# Patient Record
Sex: MALE | Race: WHITE | NOT HISPANIC OR LATINO | Employment: FULL TIME | URBAN - METROPOLITAN AREA
[De-identification: names, ages, dates, MRNs, and addresses within clinical notes are randomized per-mention and may not be internally consistent; named-entity substitution may affect disease eponyms.]

---

## 2017-04-07 ENCOUNTER — COMMUNICATION - HEALTHEAST (OUTPATIENT)
Dept: INTERNAL MEDICINE | Facility: CLINIC | Age: 47
End: 2017-04-07

## 2017-07-14 ENCOUNTER — OFFICE VISIT - HEALTHEAST (OUTPATIENT)
Dept: INTERNAL MEDICINE | Facility: CLINIC | Age: 47
End: 2017-07-14

## 2017-07-14 DIAGNOSIS — L30.9 ECZEMA: ICD-10-CM

## 2017-07-14 DIAGNOSIS — K64.9 HEMORRHOID: ICD-10-CM

## 2017-07-19 ENCOUNTER — RECORDS - HEALTHEAST (OUTPATIENT)
Dept: ADMINISTRATIVE | Facility: OTHER | Age: 47
End: 2017-07-19

## 2017-07-20 ENCOUNTER — SURGERY - HEALTHEAST (OUTPATIENT)
Dept: SURGERY | Facility: HOSPITAL | Age: 47
End: 2017-07-20

## 2017-07-20 ENCOUNTER — ANESTHESIA - HEALTHEAST (OUTPATIENT)
Dept: SURGERY | Facility: HOSPITAL | Age: 47
End: 2017-07-20

## 2017-07-20 ASSESSMENT — MIFFLIN-ST. JEOR: SCORE: 1753.5

## 2017-07-31 ENCOUNTER — RECORDS - HEALTHEAST (OUTPATIENT)
Dept: ADMINISTRATIVE | Facility: OTHER | Age: 47
End: 2017-07-31

## 2017-08-01 ENCOUNTER — RECORDS - HEALTHEAST (OUTPATIENT)
Dept: ADMINISTRATIVE | Facility: OTHER | Age: 47
End: 2017-08-01

## 2017-08-15 ENCOUNTER — RECORDS - HEALTHEAST (OUTPATIENT)
Dept: ADMINISTRATIVE | Facility: OTHER | Age: 47
End: 2017-08-15

## 2017-08-17 ENCOUNTER — RECORDS - HEALTHEAST (OUTPATIENT)
Dept: ADMINISTRATIVE | Facility: OTHER | Age: 47
End: 2017-08-17

## 2017-08-18 ENCOUNTER — RECORDS - HEALTHEAST (OUTPATIENT)
Dept: ADMINISTRATIVE | Facility: OTHER | Age: 47
End: 2017-08-18

## 2017-08-22 ENCOUNTER — RECORDS - HEALTHEAST (OUTPATIENT)
Dept: ADMINISTRATIVE | Facility: OTHER | Age: 47
End: 2017-08-22

## 2017-08-23 ENCOUNTER — RECORDS - HEALTHEAST (OUTPATIENT)
Dept: ADMINISTRATIVE | Facility: OTHER | Age: 47
End: 2017-08-23

## 2017-08-30 ENCOUNTER — RECORDS - HEALTHEAST (OUTPATIENT)
Dept: ADMINISTRATIVE | Facility: OTHER | Age: 47
End: 2017-08-30

## 2017-09-06 ENCOUNTER — RECORDS - HEALTHEAST (OUTPATIENT)
Dept: ADMINISTRATIVE | Facility: OTHER | Age: 47
End: 2017-09-06

## 2017-09-18 ENCOUNTER — RECORDS - HEALTHEAST (OUTPATIENT)
Dept: ADMINISTRATIVE | Facility: OTHER | Age: 47
End: 2017-09-18

## 2017-09-28 ENCOUNTER — RECORDS - HEALTHEAST (OUTPATIENT)
Dept: ADMINISTRATIVE | Facility: OTHER | Age: 47
End: 2017-09-28

## 2017-09-29 ENCOUNTER — RECORDS - HEALTHEAST (OUTPATIENT)
Dept: ADMINISTRATIVE | Facility: OTHER | Age: 47
End: 2017-09-29

## 2017-11-28 ENCOUNTER — RECORDS - HEALTHEAST (OUTPATIENT)
Dept: ADMINISTRATIVE | Facility: OTHER | Age: 47
End: 2017-11-28

## 2017-12-07 ENCOUNTER — COMMUNICATION - HEALTHEAST (OUTPATIENT)
Dept: INTERNAL MEDICINE | Facility: CLINIC | Age: 47
End: 2017-12-07

## 2018-02-08 ENCOUNTER — RECORDS - HEALTHEAST (OUTPATIENT)
Dept: ADMINISTRATIVE | Facility: OTHER | Age: 48
End: 2018-02-08

## 2018-02-09 ENCOUNTER — RECORDS - HEALTHEAST (OUTPATIENT)
Dept: ADMINISTRATIVE | Facility: OTHER | Age: 48
End: 2018-02-09

## 2018-03-08 ENCOUNTER — RECORDS - HEALTHEAST (OUTPATIENT)
Dept: ADMINISTRATIVE | Facility: OTHER | Age: 48
End: 2018-03-08

## 2018-06-14 ENCOUNTER — COMMUNICATION - HEALTHEAST (OUTPATIENT)
Dept: INTERNAL MEDICINE | Facility: CLINIC | Age: 48
End: 2018-06-14

## 2018-06-14 DIAGNOSIS — L30.9 ECZEMA: ICD-10-CM

## 2019-02-27 ENCOUNTER — COMMUNICATION - HEALTHEAST (OUTPATIENT)
Dept: INTERNAL MEDICINE | Facility: CLINIC | Age: 49
End: 2019-02-27

## 2019-02-27 DIAGNOSIS — L30.9 ECZEMA: ICD-10-CM

## 2019-06-14 ENCOUNTER — COMMUNICATION - HEALTHEAST (OUTPATIENT)
Dept: SCHEDULING | Facility: CLINIC | Age: 49
End: 2019-06-14

## 2019-06-14 ENCOUNTER — OFFICE VISIT - HEALTHEAST (OUTPATIENT)
Dept: INTERNAL MEDICINE | Facility: CLINIC | Age: 49
End: 2019-06-14

## 2019-06-14 DIAGNOSIS — B00.89 HERPETIC WHITLOW: ICD-10-CM

## 2019-06-14 ASSESSMENT — MIFFLIN-ST. JEOR: SCORE: 1797.44

## 2019-07-02 ENCOUNTER — OFFICE VISIT - HEALTHEAST (OUTPATIENT)
Dept: INTERNAL MEDICINE | Facility: CLINIC | Age: 49
End: 2019-07-02

## 2019-07-02 DIAGNOSIS — Z71.84 TRAVEL ADVICE ENCOUNTER: ICD-10-CM

## 2019-07-02 DIAGNOSIS — T70.29XS: ICD-10-CM

## 2019-07-02 RX ORDER — ACETAZOLAMIDE 250 MG/1
250 TABLET ORAL 2 TIMES DAILY PRN
Qty: 14 TABLET | Refills: 0 | Status: SHIPPED | OUTPATIENT
Start: 2019-07-02 | End: 2021-08-31

## 2019-07-02 ASSESSMENT — MIFFLIN-ST. JEOR: SCORE: 1801.12

## 2020-02-20 ENCOUNTER — OFFICE VISIT - HEALTHEAST (OUTPATIENT)
Dept: INTERNAL MEDICINE | Facility: CLINIC | Age: 50
End: 2020-02-20

## 2020-02-20 ENCOUNTER — RECORDS - HEALTHEAST (OUTPATIENT)
Dept: GENERAL RADIOLOGY | Facility: CLINIC | Age: 50
End: 2020-02-20

## 2020-02-20 DIAGNOSIS — M54.2 NECK PAIN: ICD-10-CM

## 2020-02-20 DIAGNOSIS — M54.2 CERVICALGIA: ICD-10-CM

## 2020-02-20 DIAGNOSIS — L30.9 ECZEMA: ICD-10-CM

## 2020-02-20 ASSESSMENT — MIFFLIN-ST. JEOR: SCORE: 1771.64

## 2020-03-19 ENCOUNTER — AMBULATORY - HEALTHEAST (OUTPATIENT)
Dept: INTERNAL MEDICINE | Facility: CLINIC | Age: 50
End: 2020-03-19

## 2020-03-19 ENCOUNTER — COMMUNICATION - HEALTHEAST (OUTPATIENT)
Dept: INTERNAL MEDICINE | Facility: CLINIC | Age: 50
End: 2020-03-19

## 2020-03-19 ASSESSMENT — ANXIETY QUESTIONNAIRES
1. FEELING NERVOUS, ANXIOUS, OR ON EDGE: NEARLY EVERY DAY
6. BECOMING EASILY ANNOYED OR IRRITABLE: SEVERAL DAYS
GAD7 TOTAL SCORE: 13
7. FEELING AFRAID AS IF SOMETHING AWFUL MIGHT HAPPEN: NOT AT ALL
IF YOU CHECKED OFF ANY PROBLEMS ON THIS QUESTIONNAIRE, HOW DIFFICULT HAVE THESE PROBLEMS MADE IT FOR YOU TO DO YOUR WORK, TAKE CARE OF THINGS AT HOME, OR GET ALONG WITH OTHER PEOPLE: SOMEWHAT DIFFICULT
5. BEING SO RESTLESS THAT IT IS HARD TO SIT STILL: NOT AT ALL
3. WORRYING TOO MUCH ABOUT DIFFERENT THINGS: NEARLY EVERY DAY
4. TROUBLE RELAXING: NEARLY EVERY DAY
2. NOT BEING ABLE TO STOP OR CONTROL WORRYING: NEARLY EVERY DAY

## 2020-03-19 ASSESSMENT — PATIENT HEALTH QUESTIONNAIRE - PHQ9: SUM OF ALL RESPONSES TO PHQ QUESTIONS 1-9: 8

## 2020-03-26 ENCOUNTER — OFFICE VISIT - HEALTHEAST (OUTPATIENT)
Dept: INTERNAL MEDICINE | Facility: CLINIC | Age: 50
End: 2020-03-26

## 2020-03-26 DIAGNOSIS — F41.1 GENERALIZED ANXIETY DISORDER: ICD-10-CM

## 2020-03-26 DIAGNOSIS — Z72.0 TOBACCO ABUSE: ICD-10-CM

## 2020-03-26 DIAGNOSIS — F32.0 MILD MAJOR DEPRESSION (H): ICD-10-CM

## 2020-03-26 DIAGNOSIS — R03.0 ELEVATED BLOOD PRESSURE READING WITHOUT DIAGNOSIS OF HYPERTENSION: ICD-10-CM

## 2020-03-26 ASSESSMENT — ANXIETY QUESTIONNAIRES
4. TROUBLE RELAXING: MORE THAN HALF THE DAYS
6. BECOMING EASILY ANNOYED OR IRRITABLE: MORE THAN HALF THE DAYS
IF YOU CHECKED OFF ANY PROBLEMS ON THIS QUESTIONNAIRE, HOW DIFFICULT HAVE THESE PROBLEMS MADE IT FOR YOU TO DO YOUR WORK, TAKE CARE OF THINGS AT HOME, OR GET ALONG WITH OTHER PEOPLE: SOMEWHAT DIFFICULT
3. WORRYING TOO MUCH ABOUT DIFFERENT THINGS: MORE THAN HALF THE DAYS
7. FEELING AFRAID AS IF SOMETHING AWFUL MIGHT HAPPEN: NOT AT ALL
2. NOT BEING ABLE TO STOP OR CONTROL WORRYING: MORE THAN HALF THE DAYS
GAD7 TOTAL SCORE: 11
5. BEING SO RESTLESS THAT IT IS HARD TO SIT STILL: SEVERAL DAYS
1. FEELING NERVOUS, ANXIOUS, OR ON EDGE: MORE THAN HALF THE DAYS

## 2020-03-26 ASSESSMENT — PATIENT HEALTH QUESTIONNAIRE - PHQ9: SUM OF ALL RESPONSES TO PHQ QUESTIONS 1-9: 7

## 2020-03-26 ASSESSMENT — MIFFLIN-ST. JEOR: SCORE: 1762.57

## 2020-03-27 ENCOUNTER — COMMUNICATION - HEALTHEAST (OUTPATIENT)
Dept: INTERNAL MEDICINE | Facility: CLINIC | Age: 50
End: 2020-03-27

## 2020-03-30 ASSESSMENT — ANXIETY QUESTIONNAIRES
2. NOT BEING ABLE TO STOP OR CONTROL WORRYING: MORE THAN HALF THE DAYS
6. BECOMING EASILY ANNOYED OR IRRITABLE: MORE THAN HALF THE DAYS
IF YOU CHECKED OFF ANY PROBLEMS ON THIS QUESTIONNAIRE, HOW DIFFICULT HAVE THESE PROBLEMS MADE IT FOR YOU TO DO YOUR WORK, TAKE CARE OF THINGS AT HOME, OR GET ALONG WITH OTHER PEOPLE: SOMEWHAT DIFFICULT
GAD7 TOTAL SCORE: 11
7. FEELING AFRAID AS IF SOMETHING AWFUL MIGHT HAPPEN: NOT AT ALL
3. WORRYING TOO MUCH ABOUT DIFFERENT THINGS: MORE THAN HALF THE DAYS
1. FEELING NERVOUS, ANXIOUS, OR ON EDGE: MORE THAN HALF THE DAYS
4. TROUBLE RELAXING: MORE THAN HALF THE DAYS
5. BEING SO RESTLESS THAT IT IS HARD TO SIT STILL: SEVERAL DAYS

## 2020-03-30 ASSESSMENT — PATIENT HEALTH QUESTIONNAIRE - PHQ9: SUM OF ALL RESPONSES TO PHQ QUESTIONS 1-9: 7

## 2020-05-11 ENCOUNTER — COMMUNICATION - HEALTHEAST (OUTPATIENT)
Dept: INTERNAL MEDICINE | Facility: CLINIC | Age: 50
End: 2020-05-11

## 2020-05-12 ENCOUNTER — OFFICE VISIT - HEALTHEAST (OUTPATIENT)
Dept: INTERNAL MEDICINE | Facility: CLINIC | Age: 50
End: 2020-05-12

## 2020-05-12 DIAGNOSIS — F32.1 MODERATE MAJOR DEPRESSION (H): ICD-10-CM

## 2020-05-12 DIAGNOSIS — Z72.0 TOBACCO ABUSE: ICD-10-CM

## 2020-05-12 DIAGNOSIS — F41.1 GENERALIZED ANXIETY DISORDER: ICD-10-CM

## 2020-05-12 DIAGNOSIS — T43.225A ADVERSE EFFECT OF SELECTIVE SEROTONIN REUPTAKE INHIBITOR (SSRI), INITIAL ENCOUNTER: ICD-10-CM

## 2020-05-12 ASSESSMENT — ANXIETY QUESTIONNAIRES
GAD7 TOTAL SCORE: 5
1. FEELING NERVOUS, ANXIOUS, OR ON EDGE: SEVERAL DAYS
IF YOU CHECKED OFF ANY PROBLEMS ON THIS QUESTIONNAIRE, HOW DIFFICULT HAVE THESE PROBLEMS MADE IT FOR YOU TO DO YOUR WORK, TAKE CARE OF THINGS AT HOME, OR GET ALONG WITH OTHER PEOPLE: NOT DIFFICULT AT ALL
5. BEING SO RESTLESS THAT IT IS HARD TO SIT STILL: MORE THAN HALF THE DAYS
2. NOT BEING ABLE TO STOP OR CONTROL WORRYING: NOT AT ALL
6. BECOMING EASILY ANNOYED OR IRRITABLE: SEVERAL DAYS
7. FEELING AFRAID AS IF SOMETHING AWFUL MIGHT HAPPEN: NOT AT ALL
3. WORRYING TOO MUCH ABOUT DIFFERENT THINGS: NOT AT ALL
4. TROUBLE RELAXING: SEVERAL DAYS

## 2020-05-12 ASSESSMENT — PATIENT HEALTH QUESTIONNAIRE - PHQ9: SUM OF ALL RESPONSES TO PHQ QUESTIONS 1-9: 14

## 2020-05-14 ENCOUNTER — COMMUNICATION - HEALTHEAST (OUTPATIENT)
Dept: INTERNAL MEDICINE | Facility: CLINIC | Age: 50
End: 2020-05-14

## 2020-06-02 ENCOUNTER — OFFICE VISIT - HEALTHEAST (OUTPATIENT)
Dept: INTERNAL MEDICINE | Facility: CLINIC | Age: 50
End: 2020-06-02

## 2020-06-02 DIAGNOSIS — H57.9 EYE PRESSURE: ICD-10-CM

## 2020-06-02 DIAGNOSIS — F19.982 DRUG-INDUCED INSOMNIA (H): ICD-10-CM

## 2020-06-02 DIAGNOSIS — F32.1 MODERATE MAJOR DEPRESSION (H): ICD-10-CM

## 2020-06-02 ASSESSMENT — ANXIETY QUESTIONNAIRES
7. FEELING AFRAID AS IF SOMETHING AWFUL MIGHT HAPPEN: NOT AT ALL
6. BECOMING EASILY ANNOYED OR IRRITABLE: SEVERAL DAYS
GAD7 TOTAL SCORE: 8
2. NOT BEING ABLE TO STOP OR CONTROL WORRYING: MORE THAN HALF THE DAYS
1. FEELING NERVOUS, ANXIOUS, OR ON EDGE: MORE THAN HALF THE DAYS
3. WORRYING TOO MUCH ABOUT DIFFERENT THINGS: MORE THAN HALF THE DAYS
IF YOU CHECKED OFF ANY PROBLEMS ON THIS QUESTIONNAIRE, HOW DIFFICULT HAVE THESE PROBLEMS MADE IT FOR YOU TO DO YOUR WORK, TAKE CARE OF THINGS AT HOME, OR GET ALONG WITH OTHER PEOPLE: NOT DIFFICULT AT ALL
4. TROUBLE RELAXING: SEVERAL DAYS
5. BEING SO RESTLESS THAT IT IS HARD TO SIT STILL: NOT AT ALL

## 2020-06-02 ASSESSMENT — PATIENT HEALTH QUESTIONNAIRE - PHQ9: SUM OF ALL RESPONSES TO PHQ QUESTIONS 1-9: 9

## 2020-06-04 ENCOUNTER — COMMUNICATION - HEALTHEAST (OUTPATIENT)
Dept: INTERNAL MEDICINE | Facility: CLINIC | Age: 50
End: 2020-06-04

## 2020-06-04 DIAGNOSIS — F32.1 MODERATE MAJOR DEPRESSION (H): ICD-10-CM

## 2020-12-04 ENCOUNTER — COMMUNICATION - HEALTHEAST (OUTPATIENT)
Dept: INTERNAL MEDICINE | Facility: CLINIC | Age: 50
End: 2020-12-04

## 2020-12-04 DIAGNOSIS — F32.1 MODERATE MAJOR DEPRESSION (H): ICD-10-CM

## 2020-12-26 ENCOUNTER — RECORDS - HEALTHEAST (OUTPATIENT)
Dept: ADMINISTRATIVE | Facility: OTHER | Age: 50
End: 2020-12-26

## 2020-12-26 LAB
ALT SERPL W/O P-5'-P-CCNC: 30 UNITS/L (ref 6–55)
AST SERPL-CCNC: 25 UNITS/L (ref 5–34)
CREAT SERPL-MCNC: 1.36 MG/DL (ref 0.65–1.25)
GFR ESTIMATE EXT - HISTORICAL: 55 ML/MIN/1.73M2
GFR ESTIMATE, IF BLACK EXT - HISTORICAL: >60 ML/MIN/1.73M2

## 2020-12-31 ENCOUNTER — COMMUNICATION - HEALTHEAST (OUTPATIENT)
Dept: INTERNAL MEDICINE | Facility: CLINIC | Age: 50
End: 2020-12-31

## 2021-01-11 ENCOUNTER — OFFICE VISIT - HEALTHEAST (OUTPATIENT)
Dept: INTERNAL MEDICINE | Facility: CLINIC | Age: 51
End: 2021-01-11

## 2021-01-11 DIAGNOSIS — N20.0 CALCULUS OF KIDNEY: ICD-10-CM

## 2021-01-11 DIAGNOSIS — L30.9 ECZEMA: ICD-10-CM

## 2021-01-11 ASSESSMENT — PATIENT HEALTH QUESTIONNAIRE - PHQ9: SUM OF ALL RESPONSES TO PHQ QUESTIONS 1-9: 4

## 2021-01-12 ENCOUNTER — COMMUNICATION - HEALTHEAST (OUTPATIENT)
Dept: INTERNAL MEDICINE | Facility: CLINIC | Age: 51
End: 2021-01-12

## 2021-01-12 ENCOUNTER — AMBULATORY - HEALTHEAST (OUTPATIENT)
Dept: UROLOGY | Facility: CLINIC | Age: 51
End: 2021-01-12

## 2021-01-12 ENCOUNTER — OFFICE VISIT - HEALTHEAST (OUTPATIENT)
Dept: UROLOGY | Facility: CLINIC | Age: 51
End: 2021-01-12

## 2021-01-12 DIAGNOSIS — N20.0 CALCULUS OF KIDNEY: ICD-10-CM

## 2021-01-12 DIAGNOSIS — N20.0 KIDNEY STONE: ICD-10-CM

## 2021-01-13 ENCOUNTER — RECORDS - HEALTHEAST (OUTPATIENT)
Dept: HEALTH INFORMATION MANAGEMENT | Facility: CLINIC | Age: 51
End: 2021-01-13

## 2021-05-26 ASSESSMENT — PATIENT HEALTH QUESTIONNAIRE - PHQ9
SUM OF ALL RESPONSES TO PHQ QUESTIONS 1-9: 8
SUM OF ALL RESPONSES TO PHQ QUESTIONS 1-9: 14

## 2021-05-27 ASSESSMENT — PATIENT HEALTH QUESTIONNAIRE - PHQ9
SUM OF ALL RESPONSES TO PHQ QUESTIONS 1-9: 9
SUM OF ALL RESPONSES TO PHQ QUESTIONS 1-9: 7
SUM OF ALL RESPONSES TO PHQ QUESTIONS 1-9: 4

## 2021-05-28 ASSESSMENT — ANXIETY QUESTIONNAIRES
GAD7 TOTAL SCORE: 13
GAD7 TOTAL SCORE: 8
GAD7 TOTAL SCORE: 11
GAD7 TOTAL SCORE: 5

## 2021-05-29 NOTE — TELEPHONE ENCOUNTER
"Patient calling as he went on [employer] to request appointment regarding wart on his finger.   Finger feels full and unable to bend finger without pain.     Reason for Disposition    SEVERE pain (e.g., excruciating, unable use hand at all)    Answer Assessment - Initial Assessment Questions  1. ONSET: \"When did the pain start?\"       Yesterday  2. LOCATION and RADIATION: \"Where is the pain located?\"  (e.g., fingertip, around nail, joint, entire   finger)       Middle finger on right hand.  Bump is located on side of finger.   3. SEVERITY: \"How bad is the pain?\" \"What does it keep you from doing?\"   (Scale 1-10; or mild, moderate, severe)   - MILD - doesn't interfere with normal activities    - MODERATE - interferes with normal activities or awakens from sleep   - SEVERE - excruciating pain, unable to hold a glass of water or bend finger even a little      Pt rates pain an 8/10 when using it.  Constant 4-5 when not using it.    4. APPEARANCE: \"What does the finger look like?\" (e.g., redness, swelling, bruising, pallor)      Bump on side of finger is grey in color outlined with white.  Outer edge is red. Increasing redness throughout the day.    5. WORK OR EXERCISE: \"Has there been any recent work or exercise that involved this part of the body?\"      Denies  6. CAUSE: \"What do you think is causing the pain?\"      Thought it was a wart at first now is thinking an infection.  7. AGGRAVATING FACTORS: \"What makes the pain worse?\" (e.g., using computer)      movement  8. OTHER SYMPTOMS: \"Do you have any other symptoms?\" (e.g., fever, neck pain, numbness)      Denies  9. PREGNANCY: \"Is there any chance you are pregnant?\" \"When was your last menstrual period?\"      no    Protocols used: FINGER PAIN-A-OH    No opening to bump on finger.   Sensitive to the touch.    No red streaks.   No recent bug or animal bites.     Per protocol patient to be seen in clinic today for evaluation. Pt agreed to plan.   Pt prefers to go to " walk-in-clinic today in [place].   Pt will be seen today.   Amanda Mcwilliams, RN   [facility] RN Triage

## 2021-05-29 NOTE — PATIENT INSTRUCTIONS - HE
If any significant increase in drainage, swelling, redness, pain, fever, weakness, numbness or other concerning symptom seek medical care immediately.

## 2021-05-29 NOTE — PROGRESS NOTES
[facility] Clinic Note    Patient Name: Thanh Huang  Patient Age: 48 y.o.  YOB: 1970  MRN: [id number]    Date of visit: 6/14/2019    Assessment/Plan:  No results found for this or any previous visit (from the past 24 hour(s)).  Medications Ordered   Medications     acyclovir (ZOVIRAX) 5 % ointment     Sig: Apply thin layer to affected area     Dispense:  15 g     Refill:  0       ICD-10-CM    1. Herpetic nathanael B00.89 acyclovir (ZOVIRAX) 5 % ointment       Differential diagnosis includes herpetic nathanael versus bacterial infection.  I would much favor herpetic nathanael given the fact that there is no surrounding erythema or purulence at this point.  We discussed risks and benefits for incision and drainage, we agreed on waiting for now, he will go to the urgent care over the weekend if it is worsening significantly.  We discussed that hand infections can be very serious and so if it is worsening he needs to get in quickly.  We will try topical acyclovir.  He will keep it covered in the meantime, we discussed pathogenesis and how it spreads.    Patient Instructions   If any significant increase in drainage, swelling, redness, pain, fever, weakness, numbness or other concerning symptom seek medical care immediately.        Counseled patient regarding treatments, treatment options, risks and benefits and diagnosis.  The patient was interactive, attentive, verbalized understanding, and we discussed plan.       Patient Active Problem List   Diagnosis     Contact Dermatitis     Nephrolithiasis     Anorectal abscess     Perirectal abscess     Social History     Social History Narrative    [employer]  on leave since 2014. Grew up in [place]-adopted. Closest family is brother in [facility].  Contact here Fr. Julio Hargrove. Rare tobacco/non drinker. ; lives in [place] [facility]     Family History   Adopted: Yes     Outpatient Encounter Medications as of 6/14/2019   Medication Sig Dispense Refill      fluocinonide-emollient (LIDEX-E) 0.05 % Crea Apply 1 application topically 2 (two) times a day as needed. Apply to area(s) of eczema as directed. 30 g 2     hydrocortisone (ANUSOL-HC) 2.5 % rectal cream Insert 1 application into the rectum every 4 (four) hours as needed for hemorrhoids.       hydrocortisone 25 mg suppository Insert 25 mg into the rectum 2 (two) times a day as needed for hemorrhoids.       ibuprofen (ADVIL,MOTRIN) 200 MG tablet Take 400 mg by mouth every 6 (six) hours as needed for pain.        lidocaine (XYLOCAINE) 5 % ointment Applied to perirectal area as needed for discomfort 35.44 g 0     methylcellulose (CITRUCEL) Powd Take 2 g by mouth daily.       senna-docusate (PERICOLACE) 8.6-50 mg tablet Take 1 tablet by mouth daily. 20 tablet 0     acyclovir (ZOVIRAX) 5 % ointment Apply thin layer to affected area 15 g 0     No facility-administered encounter medications on file as of 6/14/2019.        Chief Complaint:   Chief Complaint   Patient presents with     Finger Injury     right hand, 3rd digit       /86 (Patient Site: Left Arm, Patient Position: Sitting, Cuff Size: Adult Regular)   Pulse 74   Ht 6' (1.829 m)   Wt 197 lb 3 oz (89.4 kg)   SpO2 98%   BMI 26.74 kg/m    HPI:   Over the last 24 hours has noticed a vesicle develop on the ulnar aspect of his third digit on the right hand middle phalanx.  He has increased in pain in size.  No distal numbness or weakness.  He has not had a cold sore recently that he knows of.  He does have a history of them however.  Able to move his finger okay but feels a little swollen.  No history of skin infections.    ROS: Pertinent ros findings in hpi, all other systems negative.    Objective/Physical Exam:   Gen: NAD, appears age  Skin: warm, dry  HENT: normocephalic atraumatic, MMM  Eyes: non-icteric, no proptosis  CV: NRRR no m/r/g, no peripheral edema  Resp: CTAB no w/r/r, normal respiratory effort  Abd: non-distended, soft  Hematologic: No  petechiae or purpura  MSK: no muscle or joint swelling  Neuro: no dysarthria or gross asymmetry  Psych: Cooperative, full affect    Right third digit middle phalanx ulnar aspect there is a 1 cm diameter vesicle with clear appearing fluid and 1 much smaller vesicle just proximal of this.  There is an erythematous base there is no surrounding erythema or significant swelling.  No significant tenderness surrounding this area but it is tender at the vesicle itself.  Good cap refill and range of motion of the finger.  Radial pulses 2+.  /86 (Patient Site: Left Arm, Patient Position: Sitting, Cuff Size: Adult Regular)   Pulse 74   Ht 6' (1.829 m)   Wt 197 lb 3 oz (89.4 kg)   SpO2 98%   BMI 26.74 kg/m             Harjinder Troy MD

## 2021-05-30 ENCOUNTER — COMMUNICATION - HEALTHEAST (OUTPATIENT)
Dept: INTERNAL MEDICINE | Facility: CLINIC | Age: 51
End: 2021-05-30

## 2021-05-30 DIAGNOSIS — F32.1 MODERATE MAJOR DEPRESSION (H): ICD-10-CM

## 2021-05-30 NOTE — PROGRESS NOTES
Internal Medicine Office Visit  [facility]  Patient Name: Thanh Huang  Patient Age: 48 y.o.  YOB: 1970  MRN: [id number]    Date of Visit: 2019  Reason for Office Visit:   Chief Complaint   Patient presents with     Travel Consult     [place], , Elevation sickness.            Assessment / Plan / Medical Decision Makin. Og syndrome, sequela  - acetaZOLAMIDE (DIAMOX) 250 MG tablet; Take 1 tablet (250 mg total) by mouth 2 (two) times a day as needed.  Dispense: 14 tablet; Refill: 0    2. Travel advice encounter  Patient is provided CDC educational material discussed with patient malaria and typhoid in addition to yellow fever he reports no concerns and elects for no additional immunizations today.      No orders of the defined types were placed in this encounter.  Followup: Return in about 6 months (around 2020). earlier if needed.    Health Maintenance Review  Health Maintenance   Topic Date Due     INFLUENZA VACCINE RULE BASED (1) 2019     ADVANCE DIRECTIVES DISCUSSED WITH PATIENT  2021     TD 18+ HE  2022     TDAP ADULT ONE TIME DOSE  Completed         I have discontinued Leif Huang's hydrocortisone, hydrocortisone, senna-docusate, lidocaine, fluocinonide-emollient, and acyclovir. I am also having him start on acetaZOLAMIDE. Additionally, I am having him maintain his ibuprofen and methylcellulose.      HPI:  Thanh Huang is a 48 y.o. year old who presents to the office today Travel visit to [place].  Patient will be traveling to [place] on 2019 with a history of altitude sickness.  Patient reports that he will be in [place], [facility] and [facility].  He states he will not be in remote areas he states he has no concern for food or water borne illnesses.  He has done extensive international travel and does present with his immunization records.  Patient has however report he has had altitude illness in the past  he is requesting refill of Diamox which aided in his symptoms from his previous altitude illness during a previous travel visit.  Patient reports no addition concerns today          Review of Systems- pertinent positive in bold:  Constitutional: Fever, chills, night sweats, fainting, weight change, fatigue, dizziness, sleeping difficulties, loud snoring/pauses in breathing  Eyes: change in vision, blurred or double vision, redness/eye pain  Ears, nose, mouth, throat: change in hearing, ear pain, hoarseness, difficulty swallowing, sores in the mouth or throat  Respiratory: shortness of breath, cough, bloody sputum, wheezing  Cardiovascular: chest pain, palpitations   Gastrointestinal: abdominal pain, heartburn/indigestion, nausea/vomiting, change in appetite, change in bowel habits, constipation or diarrhea, rectal bleeding/dark stools, difficulty swallowing  Urinary: painful urination, frequent urination, urinary urgency/incontinence, blood in urine/dark urine, nocturia (new or increasing), muscle cramps, swelling of hands, feet, ankles, leg pain/redness  Skin: change in moles/freckles, rash, nodules  Hematologic/lymphatic: swollen lymph glands, abnormal bruising/bleeding  Endocrine: excessive thirst/urination, cold or heat intolerance  Neurologic/emotional: worrisome memory change, numbness/tingling, anxiety, mood swings      Current Scheduled Meds:  Outpatient Encounter Medications as of 7/2/2019   Medication Sig Dispense Refill     ibuprofen (ADVIL,MOTRIN) 200 MG tablet Take 400 mg by mouth every 6 (six) hours as needed for pain.        methylcellulose (CITRUCEL) Powd Take 2 g by mouth daily.       acetaZOLAMIDE (DIAMOX) 250 MG tablet Take 1 tablet (250 mg total) by mouth 2 (two) times a day as needed. 14 tablet 0     [DISCONTINUED] acyclovir (ZOVIRAX) 5 % ointment Apply thin layer to affected area 15 g 0     [DISCONTINUED] fluocinonide-emollient (LIDEX-E) 0.05 % Crea Apply 1 application topically 2 (two) times a  day as needed. Apply to area(s) of eczema as directed. 30 g 2     [DISCONTINUED] hydrocortisone (ANUSOL-HC) 2.5 % rectal cream Insert 1 application into the rectum every 4 (four) hours as needed for hemorrhoids.       [DISCONTINUED] hydrocortisone 25 mg suppository Insert 25 mg into the rectum 2 (two) times a day as needed for hemorrhoids.       [DISCONTINUED] lidocaine (XYLOCAINE) 5 % ointment Applied to perirectal area as needed for discomfort 35.44 g 0     [DISCONTINUED] senna-docusate (PERICOLACE) 8.6-50 mg tablet Take 1 tablet by mouth daily. 20 tablet 0     No facility-administered encounter medications on file as of 7/2/2019.      Past Medical History:   Diagnosis Date     Anxiety 2015    panic attacks/infrequent     Back pain skin tags     Blood pressure elevated 2015    borderline diastolics 88-92...     Dermatitis      Left nephrolithiasis      Past Surgical History:   Procedure Laterality Date     INCISION AND DRAINAGE PERIRECTAL ABSCESS N/A 7/20/2017    Procedure: INCISION AND DRAINAGE RECTAL ABSCESS;  Surgeon: Pavel Vasques MD;  Location: [facility] [place], [place];  Service:      Social History     Tobacco Use     Smoking status: Light Tobacco Smoker     Types: Cigars     Smokeless tobacco: Never Used     Tobacco comment: daily cigar smoker.. None since yesterday   Substance Use Topics     Alcohol use: Yes     Alcohol/week: 8.4 oz     Types: 7 Glasses of wine, 7 Shots of liquor per week     Drug use: No     Family History   Adopted: Yes     Objective / Physical Examination:  Vitals:    07/02/19 1043   BP: 130/86   Pulse: 74   Resp: 24   Temp: 98.2  F (36.8  C)   SpO2: 95%   Weight: 198 lb (89.8 kg)   Height: 6' (1.829 m)     Wt Readings from Last 3 Encounters:   07/02/19 198 lb (89.8 kg)   06/14/19 197 lb 3 oz (89.4 kg)   07/25/17 192 lb (87.1 kg)     Body mass index is 26.85 kg/m .     General Appearance: Alert and oriented, cooperative, affect appropriate, speech clear, in no apparent distress  Head:  Normocephalic, atraumatic  Eyes:   Conjunctivae clear and sclerae non-icteric  Throat: Lips and mucosa moist.  Lungs Normal inspiratory and expiratory effort  Neuro: Alert and oriented, follows commands appropriately.         Ginna Henley, CNP

## 2021-05-31 ENCOUNTER — RECORDS - HEALTHEAST (OUTPATIENT)
Dept: ADMINISTRATIVE | Facility: CLINIC | Age: 51
End: 2021-05-31

## 2021-05-31 VITALS — WEIGHT: 191 LBS | BODY MASS INDEX: 26.64 KG/M2

## 2021-05-31 VITALS — HEIGHT: 71 IN | BODY MASS INDEX: 26.74 KG/M2 | WEIGHT: 191 LBS

## 2021-05-31 RX ORDER — BUPROPION HYDROCHLORIDE 300 MG/1
TABLET ORAL
Qty: 90 TABLET | Refills: 2 | Status: SHIPPED | OUTPATIENT
Start: 2021-05-31 | End: 2022-04-04

## 2021-06-03 VITALS — HEIGHT: 72 IN | WEIGHT: 197.19 LBS | BODY MASS INDEX: 26.71 KG/M2

## 2021-06-03 VITALS — WEIGHT: 198 LBS | BODY MASS INDEX: 26.82 KG/M2 | HEIGHT: 72 IN

## 2021-06-04 VITALS
DIASTOLIC BLOOD PRESSURE: 90 MMHG | WEIGHT: 195 LBS | BODY MASS INDEX: 27.3 KG/M2 | HEIGHT: 71 IN | SYSTOLIC BLOOD PRESSURE: 120 MMHG

## 2021-06-04 VITALS
SYSTOLIC BLOOD PRESSURE: 126 MMHG | HEIGHT: 71 IN | DIASTOLIC BLOOD PRESSURE: 90 MMHG | BODY MASS INDEX: 27.02 KG/M2 | HEART RATE: 76 BPM | OXYGEN SATURATION: 96 % | WEIGHT: 193 LBS

## 2021-06-06 NOTE — PATIENT INSTRUCTIONS - HE
1. Discussed occipital neuralgia.     2. Use ibuprofen and acetaminophen as needed.     3. Follow up if fails to improve.     4. Follow up age 51 for physical

## 2021-06-06 NOTE — PROGRESS NOTES
ASSESSMENT AND PLAN:    1. Eczema  Finger PIP joints, possible psoriasis.  Refilled, avoid use of this on face or groin.   - fluocinonide-emollient (LIDEX-E) 0.05 % Crea; Apply 1 application topically 2 (two) times a day as needed. Apply to area(s) of eczema as directed.  Dispense: 30 g; Refill: 2    2. Neck pain  Right reji-aural area.  Xray is negative.  HEENT exam is negative. Suspect that this is occipital neuritis.  No clinical indication of mass or lymphadenopathy, or radiculopathy. We discussed this etiology.  Can continue to use NSAID for pain and follow.  MRI head and cervical spine if fails to improve or other symptoms develop.   - XR Cervical Spine 2 - 3 VWS; Future    Patient Instructions   1. Discussed occipital neuralgia.     2. Use ibuprofen and acetaminophen as needed.     3. Follow up if fails to improve.     4. Follow up age 51 for physical     CHIEF COMPLAINT:  Chief Complaint   Patient presents with     Consult     X two weeks right Head and neck discomfort, mild pain radiating      HISTORY OF PRESENT ILLNESS:  Thanh Huang is a 49 y.o. male with 2 weeks of right upper neck and reji-aural area.  A vague sense of radiation at times into the right eye and occipital area.  Not lancinating but variable in intensity.  No radiation into the shoulder or arm.  Initially, was down a bit into the interscapular area on the right.  Neck position does not clearly matter.  Pushing on an area over the mastoid causes an increase in the past.  No visual disturbance, or arm or hand numbness or weakness.  No trauma.     REVIEW OF SYSTEMS:   See HPI, all other systems on review are negative.    Past Medical History:   Diagnosis Date     Anxiety 2015    panic attacks/infrequent     Back pain skin tags     Blood pressure elevated 2015    borderline diastolics 88-92...     Dermatitis      Left nephrolithiasis      Social History     Tobacco Use   Smoking Status Light Tobacco Smoker     Types: Cigars  "  Smokeless Tobacco Never Used   Tobacco Comment    daily cigar smoker.. None since yesterday     Family History   Adopted: Yes     Past Surgical History:   Procedure Laterality Date     INCISION AND DRAINAGE PERIRECTAL ABSCESS N/A 7/20/2017    Procedure: INCISION AND DRAINAGE RECTAL ABSCESS;  Surgeon: Pavel Vasques MD;  [place] [facility] [place]  Service:      VITALS:  Vitals:    02/20/20 1353 02/20/20 1402   BP: (!) 128/100 120/90   Patient Site: Left Arm Left Arm   Patient Position: Sitting Sitting   Cuff Size: Adult Large Adult Large   Weight: 195 lb (88.5 kg)    Height: 5' 11\" (1.803 m)      Wt Readings from Last 3 Encounters:   02/20/20 195 lb (88.5 kg)   07/02/19 198 lb (89.8 kg)   06/14/19 197 lb 3 oz (89.4 kg)     PHYSICAL EXAM:  Constitutional:  In NAD, alert and oriented  HEENT: throat is normal, TM's are normal, nares are clear  Neck: no cervical or axillary adenopathy, no abnormal swelling  Neurologic:  Speech clear, no arm or leg weakness, gait normal       DECISION TO OBTAIN OLD RECORDS AND/OR OBTAIN HISTORY FROM SOMEONE OTHER THAN PATIENT, AND/OR ACCESSING CARE EVERYWHERE):  1  0     REVIEW AND SUMMARIZATION OF OLD RECORDS, AND/OR OBTAINING HISTORY FROM SOMEONE OTHER THAN PATIENT, AND/OR DISCUSSION OF CASE WITH ANOTHER HEALTH CARE PROVIDER:  2 0    REVIEW AND/OR ORDER OF OF CLINICAL LAB TESTS: 1  0.    REVIEW AND/OR ORDER OF RADIOLOGY TESTS: 1 0.    REVIEW AND/OR ORDER OF MEDICAL TESTS (EKG/ECHO/COLONOSCOPY/EGD): 1  0    INDEPENDENT  VISUALIZATION OF IMAGE, TRACING, OR SPECIMEN ITSELF (2 EACH):  2 personally viewed and interpreted the C spine xrays and reviewed with the patient.      TOTAL: 2    Current Outpatient Medications   Medication Sig Dispense Refill     ibuprofen (ADVIL,MOTRIN) 200 MG tablet Take 400 mg by mouth every 6 (six) hours as needed for pain.        methylcellulose (CITRUCEL) Powd Take 2 g by mouth daily.       acetaZOLAMIDE (DIAMOX) 250 MG tablet Take 1 tablet (250 mg " total) by mouth 2 (two) times a day as needed. 14 tablet 0     fluocinonide-emollient (LIDEX-E) 0.05 % Crea Apply 1 application topically 2 (two) times a day as needed. Apply to area(s) of eczema as directed. 30 g 2     Julio Torres MD  Internal Medicine  [facility]

## 2021-06-07 NOTE — TELEPHONE ENCOUNTER
Patient Returning Call  Reason for call:  Patient returning call  Information relayed to patient:  The travel screening questions were asked.   No patient is not ill and does not have sx listed in the questions.   He has not traveled and has not been in contact with anyone with Covid-19.  Patient reports I have been doing isolation with exception of food shopping.   Patient has additional questions:  Yes  If YES, what are your questions/concerns:  Please return call if anything else is needed but will plan to come in as scheduled  Okay to leave a detailed message?: Yes

## 2021-06-07 NOTE — TELEPHONE ENCOUNTER
Left message to call patient. Please relay information below per . Patient can be seen today or as scheduled next week.

## 2021-06-07 NOTE — TELEPHONE ENCOUNTER
"Left message for patient to return call to clinic. Please relay information below per Dr. Romeo.     \"This is something that he will need to come in to see me and discuss.  I cannot prescribe these types of medications for him without a visit-- not knowing him.  If he feels the need to be seen immediately and he is well and has no known sick contacts recently, he can even come in today.  He would need to be screened of course.\"  "

## 2021-06-07 NOTE — TELEPHONE ENCOUNTER
This is something that he will need to come in to see me and discuss.  I cannot prescribe these types of medications for him without a visit-- not knowing him.  If he feels the need to be seen immediately and he is well and has no known sick contacts recently, he can even come in today.  He would need to be screened of course.

## 2021-06-07 NOTE — TELEPHONE ENCOUNTER
I called and left detailed message on personal voicemail. Advised patient to return call to clinic to confirm if he is coming into the clinic tomorrow for an appt.     Please do Travel screen and make sure patient's isn't ill or has been around anyone impacted by COVID19, thank you.

## 2021-06-07 NOTE — PROGRESS NOTES
Office Visit - Follow Up   Thanh Huang   49 y.o. male    Date of Visit: 3/26/2020    Chief Complaint   Patient presents with     Establish Care     referred by friend (Dr. Orion Garcia MD)     Anxiety     increased anixety         Assessment and Plan   1. Generalized anxiety disorder  Counseled patient at length.  He has never struggled with anxiety into the last few years.  I think a trial of SSRI would be helpful here.  He is willing to try something.  He had been prescribed sertraline in the past but did not take it.  He was also prescribed Lexapro done at [facility] and did not take that.  We will try sertraline 25 mg daily for 6 days and increase to 50 mg daily.  He will touch base with me via phone in 3 weeks.  He will contact me via Proteopuret if he has any problems with the medications.  I did  him on side effects that can occur.  - sertraline (ZOLOFT) 50 MG tablet; Take 0.5 tablets (25 mg total) by mouth daily for 6 days, THEN 1 tablet (50 mg total) daily.  Dispense: 90 tablet; Refill: 1    2. Mild major depression (H)  As above.    3. Elevated blood pressure reading without diagnosis of hypertension  We will continue to follow his blood pressure as an outpatient.    4. Tobacco abuse  Have urged complete smoking cessation.        Return in about 3 weeks (around 4/16/2020) for Telephone visit.     History of Present Illness   This 49 y.o. old Thanh comes in today to establish care.  Very pleasant gentleman who has a history of anal abscess but is otherwise been fairly healthy.  He has been dealing with anxiety and depression since around 2011 around the time when he left [facility].  He has been seeing a counselor but has not taken medications.  He has been prescribed medications in the past but did not take them.  He thinks he would need some now.  He is no longer having some head and neck pain that he would originally made the appointment for.  He has a PHQ 9 and a OPAL 7 which  "are reviewed.  He otherwise denies somatic concerns.  He feels well.  He exercises regularly.    He does smoke a rare cigar.  He will have usually a cocktail in the evening and a glass of wine with dinner.  He is adopted and has no known family history.  Born and raised in [place].  Went to [employer].  Ended up here for more schooling and work.  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  Works for a company out of [place] [employer].  He has a mother living still at age 91 who is in good health.  He has a brother who is not adopted in [place] and they are not close he tells me.  He does not have a significant other.  He has dated some women he tells me.    Review of Systems: A comprehensive review of systems was negative except as noted.     Medications, Allergies and Problem List   Reviewed, reconciled and updated  Post Discharge Medication Reconciliation Status:      Physical Exam   General Appearance:   Pleasant middle-aged gentleman in no distress.  HEENT is unremarkable.  Dentition is good.  Lungs are clear.  Heart is regular.  Abdomen soft and nontender.  Blood pressure initially was quite high.  Recheck 126/90.    BP (!) 160/100 (Patient Site: Right Arm, Patient Position: Sitting, Cuff Size: Adult Regular)   Pulse 76   Ht 5' 11\" (1.803 m)   Wt 193 lb (87.5 kg)   SpO2 96%   BMI 26.92 kg/m           Additional Information   Current Outpatient Medications   Medication Sig Dispense Refill     fluocinonide-emollient (LIDEX-E) 0.05 % Crea Apply 1 application topically 2 (two) times a day as needed. Apply to area(s) of eczema as directed. 30 g 2     ibuprofen (ADVIL,MOTRIN) 200 MG tablet Take 400 mg by mouth every 6 (six) hours as needed for pain.        acetaZOLAMIDE (DIAMOX) 250 MG tablet Take 1 tablet (250 mg total) by mouth 2 (two) times a day as needed. 14 tablet 0     " sertraline (ZOLOFT) 50 MG tablet Take 0.5 tablets (25 mg total) by mouth daily for 6 days, THEN 1 tablet (50 mg total) daily. 90 tablet 1     No current facility-administered medications for this visit.      Allergies   Allergen Reactions     Garlic      Melon      Onion      Social History     Tobacco Use     Smoking status: Light Tobacco Smoker     Types: Cigars     Smokeless tobacco: Never Used     Tobacco comment: daily cigar smoker.. None since yesterday   Substance Use Topics     Alcohol use: Yes     Alcohol/week: 14.0 standard drinks     Types: 7 Glasses of wine, 7 Shots of liquor per week     Drug use: No       Review and/or order of clinical lab tests:  Review and/or order of radiology tests:  Review and/or order of medicine tests:  Discussion of test results with performing physician:  Decision to obtain old records and/or obtain history from someone other than the patient:  Review and summarization of old records and/or obtaining history from someone other than the patient and.or discussion of case with another health care provider:  Independent visualization of image, tracing or specimen itself:    Time: total time spent with the patient was 40 minutes of which >50% was spent in counseling and coordination of care     Janusz Romeo MD

## 2021-06-08 NOTE — TELEPHONE ENCOUNTER
RN cannot approve Refill Request    RN can NOT refill this medication historical medication requested. Last office visit: 3/26/2020 Janusz Romeo MD Last Physical: Visit date not found Last MTM visit: Visit date not found Last visit same specialty: 3/26/2020 Janusz Romeo MD.  Next visit within 3 mo: Visit date not found  Next physical within 3 mo: Visit date not found      Omayra CERRATO Nury, [facility] Triage/Med Refill 6/9/2020    Requested Prescriptions   Pending Prescriptions Disp Refills     buPROPion (WELLBUTRIN XL) 300 MG 24 hr tablet [Pharmacy Med Name: BUPROPION XL 300MG TABLETS] 23 tablet 0     Sig: TAKE 1 TABLET BY MOUTH EVERY MORNING AFTER FINISH THE XL 150MG       Tricyclics/Misc Antidepressant/Antianxiety Meds Refill Protocol Passed - 6/4/2020  3:39 PM        Passed - PCP or prescribing provider visit in last year     Last office visit with prescriber/PCP: 3/26/2020 Janusz Romeo MD OR same dept: Visit date not found OR same specialty: 3/26/2020 Janusz Romeo MD  Last physical: Visit date not found Last MTM visit: Visit date not found   Next visit within 3 mo: Visit date not found  Next physical within 3 mo: Visit date not found  Prescriber OR PCP: Janusz Romeo MD  Last diagnosis associated with med order: 1. Moderate major depression (H)  - buPROPion (WELLBUTRIN XL) 300 MG 24 hr tablet [Pharmacy Med Name: BUPROPION XL 300MG TABLETS]; TAKE 1 TABLET BY MOUTH EVERY MORNING AFTER FINISH THE XL 150MG  Dispense: 23 tablet; Refill: 0    If protocol passes may refill for 12 months if within 3 months of last provider visit (or a total of 15 months).

## 2021-06-08 NOTE — TELEPHONE ENCOUNTER
Prior Authorization Request  Who s requesting:  Pharmacy  Pharmacy Name and Location: [facility] #[id number] [place] [address] [address] KUMAR AT [facility]   Medication Name: buPROPion (WELLBUTRIN XL) 150 MG 24 hr tablet     Insurance Plan: [place] [employer]  Insurance Member ID Number:  [id number]  CoverMyMeds Key: [id number]  Informed patient that prior authorizations can take up to 10 business days for response:   NA  Okay to leave a detailed message: Yes

## 2021-06-08 NOTE — PROGRESS NOTES
"Thanh Huang is a 49 y.o. male who is being evaluated via a billable video visit.      The patient has been notified of following:     \"This video visit will be conducted via a call between you and your physician/provider. We have found that certain health care needs can be provided without the need for an in-person physical exam.  This service lets us provide the care you need with a video conversation.  If a prescription is necessary we can send it directly to your pharmacy.  If lab work is needed we can place an order for that and you can then stop by our lab to have the test done at a later time.    Video visits are billed at different rates depending on your insurance coverage. Please reach out to your insurance provider with any questions.    If during the course of the call the physician/provider feels a video visit is not appropriate, you will not be charged for this service.\"    Patient has given verbal consent to a Video visit? Yes    Patient would like to receive their AVS by AVS Preference: Dale.    Patient would like the video invitation sent by: Text to cell phone: [phone]    Will anyone else be joining your video visit? No        Video Start Time: 0946    Additional provider notes:     Office Visit - Follow Up   Thanh Huang   49 y.o. male    Date of Visit: 6/2/2020    Chief Complaint   Patient presents with     Depression     Dox Video CALL #[phone] - discuss possible side effects due to Wellbutrin (bilateral eye pressure and insomnia) - see PHQ9/GAD7         Assessment and Plan   1. Moderate major depression (H)  Better control with current regimen.  He would like to continue same.  If his eye pressure issue returns or persists he is to meet with ophthalmology.  Similarly if he has any vision disturbance.  I will recheck with him in 1 month.    2. Eye pressure  As above.    3. Drug-induced insomnia (H)  Resolved for now.        Return in about 4 weeks (around " 6/30/2020) for Recheck.     History of Present Illness   This 49 y.o. old patient follows up via video today for his disturbance.  He is now on Wellbutrin.  Did not tolerate SSRI due to sexual side effect seems to be tolerating the U-turn without difficulty.  Sexual side effects have resolved.  Mood where he will.  He is noted a couple times where he had some pressure behind his eyes but no vision disturbance.  No history of glaucoma or glaucoma suspected he just had eye exam within the last few months he tells me.  He had been awakening when he first started the medication but this has resolved.  He wants to continue the medication.    Review of Systems: A comprehensive review of systems was negative except as noted.     Medications, Allergies and Problem List   Reviewed, reconciled and updated  Post Discharge Medication Reconciliation Status:      Physical Exam   General Appearance:   Pleasant gentleman in no distress.  PHQ 9 noted.There were no vitals taken for this visit.         Additional Information   Current Outpatient Medications   Medication Sig Dispense Refill     buPROPion (WELLBUTRIN XL) 300 MG 24 hr tablet Take 1 tablet by mouth daily.       fluocinonide-emollient (LIDEX-E) 0.05 % Crea Apply 1 application topically 2 (two) times a day as needed. Apply to area(s) of eczema as directed. 30 g 2     ibuprofen (ADVIL,MOTRIN) 200 MG tablet Take 400 mg by mouth every 6 (six) hours as needed for pain.        acetaZOLAMIDE (DIAMOX) 250 MG tablet Take 1 tablet (250 mg total) by mouth 2 (two) times a day as needed. 14 tablet 0     No current facility-administered medications for this visit.      Allergies   Allergen Reactions     Garlic      Melon      Onion      Social History     Tobacco Use     Smoking status: Light Tobacco Smoker     Types: Cigars     Smokeless tobacco: Never Used     Tobacco comment: daily cigar smoker.. None since yesterday   Substance Use Topics     Alcohol use: Yes     Alcohol/week: 14.0  standard drinks     Types: 7 Glasses of wine, 7 Shots of liquor per week     Drug use: No       Review and/or order of clinical lab tests:  Review and/or order of radiology tests:  Review and/or order of medicine tests:  Discussion of test results with performing physician:  Decision to obtain old records and/or obtain history from someone other than the patient:  Review and summarization of old records and/or obtaining history from someone other than the patient and.or discussion of case with another health care provider:  Independent visualization of image, tracing or specimen itself:    Time: not applicable     Janusz Romeo MD      Video-Visit Details    Type of service:  Video Visit    Video End Time (time video stopped): 0952  Originating Location (pt. Location): Home    Distant Location (provider location):  [facility] INTERNAL MEDICINE     Platform used for Video Visit: Aste[employer]      Janusz Romeo MD

## 2021-06-08 NOTE — PROGRESS NOTES
"Thanh Huang is a 49 y.o. male who is being evaluated via a billable video visit.      The patient has been notified of following:     \"This video visit will be conducted via a call between you and your physician/provider. We have found that certain health care needs can be provided without the need for an in-person physical exam.  This service lets us provide the care you need with a video conversation.  If a prescription is necessary we can send it directly to your pharmacy.  If lab work is needed we can place an order for that and you can then stop by our lab to have the test done at a later time.    Video visits are billed at different rates depending on your insurance coverage. Please reach out to your insurance provider with any questions.    If during the course of the call the physician/provider feels a video visit is not appropriate, you will not be charged for this service.\"    Patient has given verbal consent to a Video visit? Yes    Patient would like to receive their AVS by AVS Preference: Dale.    Patient would like the video invitation sent by: Text to cell phone: [phone]    Will anyone else be joining your video visit? No        Video Start Time: 0831    Additional provider notes:     Office Visit - Follow Up   Thanh Huang   49 y.o. male    Date of Visit: 5/12/2020    Chief Complaint   Patient presents with     Anxiety     Dox Video CALL # [phone], Med f/u, discuss medication changes due to side effects      Depression     Review completed PHQ9 & GAD7 forms in flowsheet         Assessment and Plan   1. Moderate major depression (H)  I discussed with patient that with his mood and anxiety that SSRIs are the first-line medications but with the sexual side effects that he is having I suspect that most SSRIs are going to cause this for him.  Anxiety not as much of an issue as the mood is at this point.  I think a trial of Wellbutrin might be good here in this young woman.  " He has no seizure history.  Does not drink to excess.  He also smokes which may be a good thing for him.  I discussed with him that smoking cessation may be easier with this medication as well.  He understands.  We will discontinue the sertraline and begin Wellbutrin 150 mg daily in the mornings for 1 week then increase to 300 mg.  He will follow-up with me in 3 weeks.  Sooner if any problems.  - buPROPion (WELLBUTRIN XL) 150 MG 24 hr tablet; Take 1 tablet (150 mg total) by mouth every morning for 7 days, THEN 2 tablets (300 mg total) every morning for 23 days.  Dispense: 60 tablet; Refill: 0    2. Adverse effect of selective serotonin reuptake inhibitor (SSRI), initial encounter  As above.  I did discuss with him that the sexual side effects should resolve quickly after coming off of the sertraline.    3. Generalized anxiety disorder  As above.  Trial of Wellbutrin.  - buPROPion (WELLBUTRIN XL) 150 MG 24 hr tablet; Take 1 tablet (150 mg total) by mouth every morning for 7 days, THEN 2 tablets (300 mg total) every morning for 23 days.  Dispense: 60 tablet; Refill: 0    4. Tobacco abuse  Counseled patient on smoking cessation at this time.  Hopefully the Wellbutrin will make this easier for him.        No follow-ups on file.     History of Present Illness   This 49 y.o. old Thanh is a new patient to me having just establish care recently.  He was having anxiety and depression symptoms.  He has had this intermittently pretty much his whole life but he is never been treated.  He had been given some Lexapro by the [facility] years ago but never took it.  We started him on sertraline and he felt a little bit more depressed on it initially.  That seemed but then he developed sexual side effects with anorgasmia.  This was quite bothersome to him.  He would like to consider changing medications but is also having difficulty sleeping at night with early morning awakening.  Anxiety seems a little better but mood is  down.  See PHQ 9 and OPAL 7.  Work is been good.  No other new somatic concerns.  He continues to smoke on occasion.    Review of Systems: A comprehensive review of systems was negative except as noted.     Medications, Allergies and Problem List   Reviewed, reconciled and updated  Post Discharge Medication Reconciliation Status:      Physical Exam   General Appearance:   Thin gentleman in no distress.  Not tearful or overly anxious.  OPAL 7 and PHQ 9 noted.    There were no vitals taken for this visit.         Additional Information   Current Outpatient Medications   Medication Sig Dispense Refill     fluocinonide-emollient (LIDEX-E) 0.05 % Crea Apply 1 application topically 2 (two) times a day as needed. Apply to area(s) of eczema as directed. 30 g 2     ibuprofen (ADVIL,MOTRIN) 200 MG tablet Take 400 mg by mouth every 6 (six) hours as needed for pain.        acetaZOLAMIDE (DIAMOX) 250 MG tablet Take 1 tablet (250 mg total) by mouth 2 (two) times a day as needed. 14 tablet 0     buPROPion (WELLBUTRIN XL) 150 MG 24 hr tablet Take 1 tablet (150 mg total) by mouth every morning for 7 days, THEN 2 tablets (300 mg total) every morning for 23 days. 60 tablet 0     No current facility-administered medications for this visit.      Allergies   Allergen Reactions     Garlic      Melon      Onion      Social History     Tobacco Use     Smoking status: Light Tobacco Smoker     Types: Cigars     Smokeless tobacco: Never Used     Tobacco comment: daily cigar smoker.. None since yesterday   Substance Use Topics     Alcohol use: Yes     Alcohol/week: 14.0 standard drinks     Types: 7 Glasses of wine, 7 Shots of liquor per week     Drug use: No       Review and/or order of clinical lab tests:  Review and/or order of radiology tests:  Review and/or order of medicine tests:  Discussion of test results with performing physician:  Decision to obtain old records and/or obtain history from someone other than the patient:  Review and  summarization of old records and/or obtaining history from someone other than the patient and.or discussion of case with another health care provider:  Independent visualization of image, tracing or specimen itself:    Time: not applicable     Janusz Romeo MD      Video-Visit Details    Type of service:  Video Visit    Video End Time (time video stopped): 0843  Originating Location (pt. Location): Home    Distant Location (provider location):  [facility] INTERNAL MEDICINE     Platform used for Video Visit: [facility]      Janusz Romeo MD

## 2021-06-08 NOTE — TELEPHONE ENCOUNTER
Central PA team  [phone]  Pool: [employer] PA MED ([id number])          PA has been initiated.       PA form completed and faxed insurance via Cover My Meds     Key:  [id number]  [id number]     Medication:  buPROPion HCl ER (XL) 150MG er tablets    Insurance:  [facility]         Response will be received via fax and may take up to 5-10 business days depending on plan

## 2021-06-11 NOTE — PROGRESS NOTES
Thanh Maravillabecky  1970      Assessment and Plan:  1. Progressive rectal/hemorrhoidal pain on defecation and even at rest. Hx hemorrhoids  Plan: anusol cream/suppositories; stool softener/baths as before. Arrange colorectal consult likely needs scope/sigmoid or anoscopy  2. Recent URI symptoms with chills/coug- resolving      Chief Complaint: hemorrhoids    Visit diagnoses:    1. Hemorrhoid  hydrocortisone 25 mg suppository    hydrocortisone (ANUSOL-HC) 2.5 % rectal cream    Ambulatory referral to Colorectal Surgery   2. Eczema  fluocinonide-emollient (FLUOCINONIDE-EMOLLIENT) 0.05 % Crea       Meds:  Current Outpatient Prescriptions   Medication Sig Dispense Refill     fluocinonide-emollient (FLUOCINONIDE-EMOLLIENT) 0.05 % Crea Bid prn eczema 60 g 4     hydrocortisone (ANUSOL-HC) 2.5 % rectal cream Apply every 4 hrs prn 30 g 12     hydrocortisone 25 mg suppository Insert 1 suppository (25 mg total) into the rectum 2 (two) times a day. 12 suppository 12     No current facility-administered medications for this visit.        No Known Allergies    ROS: complete review of symptoms otherwise negative except as noted below    S: Leif is had a history of hemorrhoids but for the past few days symptoms have been very difficult he has been doing some running and exercising he thinks that may have worsened the problem.  He was having pain with defecation and now he has pain just sitting.  No discharge no blood.  He also recently had a cold but that seems to be resolving he mentioned he had some chills and may be a fever a couple nights ago.  He has never seen a consultant for hemorrhoids.  He has some GI issues was seen at [facility] is never had a colonoscopy or scope test.  For social history he is now in the process of officially leaving the [employer] as a  and has recently met with the [facility] and things seem to be going smoothly in that regard       O:   Vitals:    07/14/17 1405   BP: 126/82   Pulse: 80    Temp: 98.4  F (36.9  C)   SpO2: 99%   Weight: 191 lb (86.6 kg)       Physical Exam:  General-  VS- see above  HEENT- neg   Neck- no adenopathy/thyromegaly/bruits  Chest- clear   Cor- reg no murmurs/gallops/ectopics  Abdomen- soft non tender, no masses; no organomegaly  Rectal exam-he has significant perianal inflammation no ulcerations he has a hemorrhoid but is not particularly thrombosed.  No blood    Julio Rudolph MD

## 2021-06-12 NOTE — ANESTHESIA CARE TRANSFER NOTE
Last vitals:   Vitals:    07/20/17 1700   BP: 161/90   Pulse: 81   Resp: 16   Temp: 36.4  C (97.5  F)   SpO2: 99%     Patient's level of consciousness is drowsy  Spontaneous respirations: yes  Maintains airway independently: yes  Dentition unchanged: yes  Oropharynx: oropharynx clear of all foreign objects    QCDR Measures:  ASA# 20 - Surgical Safety Checklist: ASA20A - Safety Checks Done  PQRS# 430 - Adult PONV Prevention: 4558F - Pt received => 2 anti-emetic agents (different classes) preop & intraop  ASA# 8 - Peds PONV Prevention: NA - Not pediatric patient, not GA or 2 or more risk factors NOT present  PQRS# 424 - Eleanor-op Temp Management: 4559F - At least one body temp DOCUMENTED => 35.5C or 95.9F within required timeframe  PQRS# 426 - PACU Transfer Protocol: - Transfer of care checklist used  ASA# 14 - Acute Post-op Pain: ASA14B - Patient did NOT experience pain >= 7 out of 10

## 2021-06-12 NOTE — ANESTHESIA POSTPROCEDURE EVALUATION
Patient: Thanh Huang  INCISION AND DRAINAGE RECTAL ABSCESS  Anesthesia type: general    Patient location: PACU  Last vitals:   Vitals:    07/20/17 1730   BP: 140/80   Pulse: 68   Resp: 9   Temp:    SpO2: 97%     Post vital signs: stable  Level of consciousness: alert and conversant  Post-anesthesia pain: pain controlled  Post-anesthesia nausea and vomiting: no  Pulmonary: unassisted, return to baseline  Cardiovascular: stable and blood pressure at baseline  Hydration: adequate  Anesthetic events: no    QCDR Measures:  ASA# 11 - Eleanor-op Cardiac Arrest: ASA11B - Patient did NOT experience unanticipated cardiac arrest  ASA# 12 - Eleanor-op Mortality Rate: ASA12B - Patient did NOT die  ASA# 13 - PACU Re-Intubation Rate: ASA13B - Patient did NOT require a new airway mgmt  ASA# 10 - Composite Anes Safety: ASA10A - No serious adverse event  ASA# 38 - New Corneal Injury: ASA38A - No new exposure keratitis or corneal abrasion in PACU    Additional Notes:

## 2021-06-13 NOTE — TELEPHONE ENCOUNTER
RN cannot approve Refill Request    RN can NOT refill this medication medication not on med list. Last office visit: 3/26/2020 Janusz Romeo MD Last Physical: Visit date not found Last MTM visit: Visit date not found Last visit same specialty: 3/26/2020 Janusz Romeo MD.  Next visit within 3 mo: Visit date not found  Next physical within 3 mo: Visit date not found      Dora Pierce, [facility] Connection Triage/Med Refill 12/6/2020    Requested Prescriptions   Pending Prescriptions Disp Refills     buPROPion (WELLBUTRIN XL) 300 MG 24 hr tablet [Pharmacy Med Name: BUPROPION XL 300MG TABLETS] 90 tablet 1     Sig: TAKE 1 TABLET(300 MG) BY MOUTH EVERY MORNING       Tricyclics/Misc Antidepressant/Antianxiety Meds Refill Protocol Passed - 12/4/2020 11:44 AM        Passed - PCP or prescribing provider visit in last year     Last office visit with prescriber/PCP: 3/26/2020 Janusz Romeo MD OR same dept: Visit date not found OR same specialty: 3/26/2020 Janusz Romeo MD  Last physical: Visit date not found Last MTM visit: Visit date not found   Next visit within 3 mo: Visit date not found  Next physical within 3 mo: Visit date not found  Prescriber OR PCP: Janusz Romeo MD  Last diagnosis associated with med order: 1. Moderate major depression (H)  - buPROPion (WELLBUTRIN XL) 300 MG 24 hr tablet [Pharmacy Med Name: BUPROPION XL 300MG TABLETS]; TAKE 1 TABLET(300 MG) BY MOUTH EVERY MORNING  Dispense: 90 tablet; Refill: 1    If protocol passes may refill for 12 months if within 3 months of last provider visit (or a total of 15 months).

## 2021-06-14 NOTE — PROGRESS NOTES
Thanh Huang is a 50 y.o. male who is being evaluated via a billable video visit.      How would you like to obtain your AVS? MyChart.  If dropped from the video visit, the video invitation should be resent by: [phone]  Will anyone else be joining your video visit? No      Video Start Time: 0826      Office Visit - Follow Up   Thanh Huang   50 y.o. male    Date of Visit: 1/11/2021    Chief Complaint   Patient presents with     Follow-up     Dox Video: [phone] Kidney Stone f/u 12/27/20 [facility] ([place]) - was able to pass stone; no recurrent sxs's           Assessment and Plan   1. Nephrolithiasis  We discussed good hydration for future prevention.  He should meet with urology.  We will get him set up with kidney stone institute.  He has passed the stone and he has it with him.  I have asked that he bring that with him to that appointment and they can do a stone analysis.  I have also asked that we obtain his records from [place].    2. Eczema    - fluocinonide-emollient (LIDEX-E) 0.05 % Crea; Apply 1 application topically 2 (two) times a day as needed. Apply to area(s) of eczema as directed.  Dispense: 15 g; Refill: 2        No follow-ups on file.     History of Present Illness   This 50 y.o. old Thanh joins me on a video visit for follow-up of the hospital visit he had in [place].  He was visiting friends.  He developed dysuria and flank pain that was quite severe.  This was similar to previous episode of nephrolithiasis that he had.  Pain was so severe he went to emergency room where he had a CT scan.  I do not have those results but he did indeed have a stone that was obstructing.  He believes he passed it.  He has the stone.  He is back in the [facility] and feels well.  Again this is his second episode of nephrolithiasis.    Review of Systems: A comprehensive review of systems was negative except as noted.     Medications, Allergies and Problem List    Reviewed, reconciled and updated  Post Discharge Medication Reconciliation Status:      Physical Exam   General Appearance:   Pleasant gentleman in no distress.    There were no vitals taken for this visit.         Additional Information   Current Outpatient Medications   Medication Sig Dispense Refill     buPROPion (WELLBUTRIN XL) 300 MG 24 hr tablet TAKE 1 TABLET(300 MG) BY MOUTH EVERY MORNING 90 tablet 1     fluocinonide-emollient (LIDEX-E) 0.05 % Crea Apply 1 application topically 2 (two) times a day as needed. Apply to area(s) of eczema as directed. 15 g 2     ibuprofen (ADVIL,MOTRIN) 200 MG tablet Take 400 mg by mouth every 6 (six) hours as needed for pain.        acetaZOLAMIDE (DIAMOX) 250 MG tablet Take 1 tablet (250 mg total) by mouth 2 (two) times a day as needed. 14 tablet 0     No current facility-administered medications for this visit.      Allergies   Allergen Reactions     Garlic      Melon      Onion      Social History     Tobacco Use     Smoking status: Light Tobacco Smoker     Types: Cigars     Smokeless tobacco: Never Used     Tobacco comment: daily cigar smoker.. None since yesterday   Substance Use Topics     Alcohol use: Yes     Alcohol/week: 14.0 standard drinks     Types: 7 Glasses of wine, 7 Shots of liquor per week     Drug use: No       Review and/or order of clinical lab tests:  Review and/or order of radiology tests:  Review and/or order of medicine tests:  Discussion of test results with performing physician:  Decision to obtain old records and/or obtain history from someone other than the patient:  Review and summarization of old records and/or obtaining history from someone other than the patient and.or discussion of case with another health care provider:  Independent visualization of image, tracing or specimen itself:    Time: not applicable     Janusz Romeo MD        Video-Visit Details    Type of service:  Video Visit    Video End Time (time video stopped): 0832  Originating  Location (pt. Location): Home    Distant Location (provider location):  [facility]     Platform used for Video Visit: [employer]

## 2021-06-14 NOTE — TELEPHONE ENCOUNTER
It would be nice if we could analyze the stone to help guide our preventative measures.  Would he be willing to bring the stone in for stone analysis?  If so, please place the order for stone analysis.  Diagnosis is nephrolithiasis.  Thank you.

## 2021-06-14 NOTE — PROGRESS NOTES
Patient referred by Dr. Romeo    Patient was scheduled for appointment and completed rooming but chose to defer appointment after answering preliminary questions.  No charge given no visit completed.

## 2021-06-16 PROBLEM — K61.2 ANORECTAL ABSCESS: Status: ACTIVE | Noted: 2017-07-21

## 2021-06-16 PROBLEM — F32.1 MODERATE MAJOR DEPRESSION (H): Status: ACTIVE | Noted: 2020-06-02

## 2021-06-16 PROBLEM — K61.1 PERIRECTAL ABSCESS: Status: ACTIVE | Noted: 2017-07-26

## 2021-06-17 NOTE — TELEPHONE ENCOUNTER
Telephone Encounter by Martha Richardson at 5/20/2020  8:08 AM     Author: Martha Richardson Service: -- Author Type: --    Filed: 5/20/2020  8:09 AM Encounter Date: 5/14/2020 Status: Signed    : Martha Richardson APPROVED:    Approval start date: 05/19/2020  Approval end date:  05/19/2023    Pharmacy has been notified of approval and will contact patient when medication is ready for pickup.

## 2021-06-25 NOTE — TELEPHONE ENCOUNTER
Refill Approved    Rx renewed per Medication Renewal Policy. Medication was last renewed on 12/7/20, last OV 1/11/21.    Emelyn Azar, [facility] Connection Triage/Med Refill 5/31/2021     Requested Prescriptions   Pending Prescriptions Disp Refills     buPROPion (WELLBUTRIN XL) 300 MG 24 hr tablet [Pharmacy Med Name: BUPROPION XL 300MG TABLETS] 90 tablet 1     Sig: TAKE 1 TABLET(300 MG) BY MOUTH EVERY MORNING       Tricyclics/Misc Antidepressant/Antianxiety Meds Refill Protocol Passed - 5/30/2021 11:21 AM        Passed - PCP or prescribing provider visit in last year     Last office visit with prescriber/PCP: 3/26/2020 Janusz Romeo MD OR same dept: Visit date not found OR same specialty: 3/26/2020 Janusz Romeo MD  Last physical: Visit date not found Last MTM visit: Visit date not found   Next visit within 3 mo: Visit date not found  Next physical within 3 mo: Visit date not found  Prescriber OR PCP: Janusz Romeo MD  Last diagnosis associated with med order: 1. Moderate major depression (H)  - buPROPion (WELLBUTRIN XL) 300 MG 24 hr tablet [Pharmacy Med Name: BUPROPION XL 300MG TABLETS]; TAKE 1 TABLET(300 MG) BY MOUTH EVERY MORNING  Dispense: 90 tablet; Refill: 1    If protocol passes may refill for 12 months if within 3 months of last provider visit (or a total of 15 months).

## 2021-07-03 NOTE — ADDENDUM NOTE
Addendum Note by Juan Rudolph MD at 6/14/2018  3:21 PM     Author: Juan Rudolph MD Service: -- Author Type: Physician    Filed: 6/14/2018  3:21 PM Encounter Date: 6/14/2018 Status: Signed    : Juan Rudolph MD (Physician)    Addended by: JUAN RUDOLPH on: 6/14/2018 03:21 PM        Modules accepted: Orders

## 2021-08-03 ENCOUNTER — TRANSFERRED RECORDS (OUTPATIENT)
Dept: HEALTH INFORMATION MANAGEMENT | Facility: CLINIC | Age: 51
End: 2021-08-03

## 2021-08-15 ENCOUNTER — HEALTH MAINTENANCE LETTER (OUTPATIENT)
Age: 51
End: 2021-08-15

## 2021-08-31 ENCOUNTER — VIRTUAL VISIT (OUTPATIENT)
Dept: INTERNAL MEDICINE | Facility: CLINIC | Age: 51
End: 2021-08-31
Payer: COMMERCIAL

## 2021-08-31 DIAGNOSIS — F32.1 MODERATE MAJOR DEPRESSION (H): ICD-10-CM

## 2021-08-31 DIAGNOSIS — Z12.11 ENCOUNTER FOR SCREENING COLONOSCOPY: ICD-10-CM

## 2021-08-31 DIAGNOSIS — L30.9 ECZEMA, UNSPECIFIED TYPE: ICD-10-CM

## 2021-08-31 DIAGNOSIS — N52.9 ERECTILE DYSFUNCTION, UNSPECIFIED ERECTILE DYSFUNCTION TYPE: Primary | ICD-10-CM

## 2021-08-31 PROCEDURE — 99214 OFFICE O/P EST MOD 30 MIN: CPT | Mod: 95 | Performed by: INTERNAL MEDICINE

## 2021-08-31 RX ORDER — SILDENAFIL 100 MG/1
50-100 TABLET, FILM COATED ORAL DAILY PRN
Qty: 6 TABLET | Refills: 3 | Status: SHIPPED | OUTPATIENT
Start: 2021-08-31 | End: 2022-01-19

## 2021-08-31 ASSESSMENT — PATIENT HEALTH QUESTIONNAIRE - PHQ9: SUM OF ALL RESPONSES TO PHQ QUESTIONS 1-9: 0

## 2021-08-31 NOTE — PROGRESS NOTES
Thanh is a 51 year old who is being evaluated via a billable video visit.      How would you like to obtain your AVS? MyChart  If the video visit is dropped, the invitation should be resent by: Text to cell phone: [phone]  Will anyone else be joining your video visit? No    Video Start Time: 1425      Office Visit - Follow Up   Thanh Huang   51 year old male    Date of Visit: 8/31/2021    Chief Complaint   Patient presents with     Erectile Dysfunction     Dox Video: [phone] - ? if wellbutrin can be using ED or start medication for ED        Assessment and Plan   1. Erectile dysfunction, unspecified erectile dysfunction type  He may have some mild vasculogenic ED due to his history of smoking but some of this may be performance anxiety as well.  Trial of Viagra.  We discussed how to take the medication and potential side effects.  I do not think his Wellbutrin is causing his symptoms.  - sildenafil (VIAGRA) 100 MG tablet; Take 0.5-1 tablets ( mg) by mouth daily as needed  Dispense: 6 tablet; Refill: 3    2. Moderate major depression (H)  Good control with current regimen continue same.  He does tell me he is going to be moving to [place] later this year.  He dislikes winter.    3. Encounter for screening colonoscopy    - Adult Gastro Ref - Procedure Only; Future    4. Eczema, unspecified type  I renewed his Lidex ointment today.  - fluocinonide-emollient (LIDEX-E) 0.05 % cream; Apply topically 2 times daily as needed [FLUOCINONIDE-EMOLLIENT (LIDEX-E) 0.05 % CREA] Apply 1 application topically 2 (two) times a day as needed. Apply to area(s) of eczema as directed.  Dispense: 30 g; Refill: 1        Return in about 2 months (around 10/31/2021) for Routine preventive, with me, in person.     History of Present Illness   This 51 year old Thanh joins me on a video visit for discussion of rectal dysfunction.  He has a new relationship and there is some probable performance anxiety that  he is unable to obtain and maintain a firm erection for penetration.  He wonders if it could be due to his medication, bupropion.  He has been on this for many months and has not had issues prior to currently.  He is otherwise feeling well.  He continues to smoke although very rarely.  He is due for his colonoscopy and would like a refill of his Lidex ointment as well.    Review of Systems: A comprehensive review of systems was negative except as noted.     Medications, Allergies and Problem List   Reviewed, reconciled and updated  Post Discharge Medication Reconciliation Status:      Physical Exam   General Appearance:   Pleasant gentleman who looks well on video.    There were no vitals taken for this visit.         Additional Information   Current Outpatient Medications   Medication Sig Dispense Refill     buPROPion (WELLBUTRIN XL) 300 MG 24 hr tablet [BUPROPION (WELLBUTRIN XL) 300 MG 24 HR TABLET] TAKE 1 TABLET(300 MG) BY MOUTH EVERY MORNING 90 tablet 2     fluocinonide-emollient (LIDEX-E) 0.05 % cream Apply topically 2 times daily as needed [FLUOCINONIDE-EMOLLIENT (LIDEX-E) 0.05 % CREA] Apply 1 application topically 2 (two) times a day as needed. Apply to area(s) of eczema as directed. 30 g 1     ibuprofen (ADVIL,MOTRIN) 200 MG tablet [IBUPROFEN (ADVIL,MOTRIN) 200 MG TABLET] Take 400 mg by mouth every 6 (six) hours as needed for pain.        sildenafil (VIAGRA) 100 MG tablet Take 0.5-1 tablets ( mg) by mouth daily as needed 6 tablet 3     Allergies   Allergen Reactions     Garlic Unknown     Melon Unknown     Onion Unknown     Social History     Tobacco Use     Smoking status: Light Tobacco Smoker     Types: Cigars, Cigars     Smokeless tobacco: Never Used     Tobacco comment: daily cigar smoker.. None since yesterday   Substance Use Topics     Alcohol use: Yes     Alcohol/week: 14.0 standard drinks     Drug use: No       Review and/or order of clinical lab tests:  Review and/or order of radiology  tests:  Review and/or order of medicine tests:  Discussion of test results with performing physician:  Decision to obtain old records and/or obtain history from someone other than the patient:  Review and summarization of old records and/or obtaining history from someone other than the patient and.or discussion of case with another health care provider:  Independent visualization of image, tracing or specimen itself:    Time:      UMAIR CHEATHAM MD  Video-Visit Details    Type of service:  Video Visit    Video End Time:1435    Originating Location (pt. Location): Home    Distant Location (provider location):  [facility]     Platform used for Video Visit: [employer]

## 2021-10-11 ENCOUNTER — HEALTH MAINTENANCE LETTER (OUTPATIENT)
Age: 51
End: 2021-10-11

## 2021-10-19 PROBLEM — F32.9 MAJOR DEPRESSION: Status: ACTIVE | Noted: 2020-06-02

## 2022-01-17 ENCOUNTER — APPOINTMENT (OUTPATIENT)
Dept: URGENT CARE | Facility: CLINIC | Age: 52
End: 2022-01-17
Payer: COMMERCIAL

## 2022-01-19 ENCOUNTER — OFFICE VISIT (OUTPATIENT)
Dept: INTERNAL MEDICINE | Facility: CLINIC | Age: 52
End: 2022-01-19
Payer: COMMERCIAL

## 2022-01-19 VITALS
BODY MASS INDEX: 26.14 KG/M2 | DIASTOLIC BLOOD PRESSURE: 88 MMHG | WEIGHT: 193 LBS | HEIGHT: 72 IN | TEMPERATURE: 97.8 F | OXYGEN SATURATION: 97 % | HEART RATE: 86 BPM | SYSTOLIC BLOOD PRESSURE: 124 MMHG

## 2022-01-19 DIAGNOSIS — N52.9 ERECTILE DYSFUNCTION, UNSPECIFIED ERECTILE DYSFUNCTION TYPE: ICD-10-CM

## 2022-01-19 DIAGNOSIS — F32.1 MODERATE MAJOR DEPRESSION (H): ICD-10-CM

## 2022-01-19 DIAGNOSIS — F17.200 NICOTINE DEPENDENCE, UNCOMPLICATED, UNSPECIFIED NICOTINE PRODUCT TYPE: ICD-10-CM

## 2022-01-19 DIAGNOSIS — L82.1 SEBORRHEIC KERATOSES: ICD-10-CM

## 2022-01-19 DIAGNOSIS — F41.1 GENERALIZED ANXIETY DISORDER: ICD-10-CM

## 2022-01-19 DIAGNOSIS — U07.1 INFECTION DUE TO 2019 NOVEL CORONAVIRUS: ICD-10-CM

## 2022-01-19 DIAGNOSIS — Z00.00 ROUTINE ADULT HEALTH MAINTENANCE: Primary | ICD-10-CM

## 2022-01-19 DIAGNOSIS — L30.9 ECZEMA, UNSPECIFIED TYPE: ICD-10-CM

## 2022-01-19 DIAGNOSIS — R74.01 ELEVATED TRANSAMINASE LEVEL: ICD-10-CM

## 2022-01-19 LAB
ALBUMIN SERPL-MCNC: 4.1 G/DL (ref 3.5–5)
ALBUMIN UR-MCNC: NEGATIVE MG/DL
ALP SERPL-CCNC: 85 U/L (ref 45–120)
ALT SERPL W P-5'-P-CCNC: 56 U/L (ref 0–45)
ANION GAP SERPL CALCULATED.3IONS-SCNC: 13 MMOL/L (ref 5–18)
APPEARANCE UR: CLEAR
AST SERPL W P-5'-P-CCNC: 127 U/L (ref 0–40)
BILIRUB SERPL-MCNC: 0.3 MG/DL (ref 0–1)
BILIRUB UR QL STRIP: NEGATIVE
BUN SERPL-MCNC: 18 MG/DL (ref 8–22)
CALCIUM SERPL-MCNC: 9.5 MG/DL (ref 8.5–10.5)
CHLORIDE BLD-SCNC: 106 MMOL/L (ref 98–107)
CO2 SERPL-SCNC: 23 MMOL/L (ref 22–31)
COLOR UR AUTO: YELLOW
CREAT SERPL-MCNC: 1.19 MG/DL (ref 0.7–1.3)
ERYTHROCYTE [DISTWIDTH] IN BLOOD BY AUTOMATED COUNT: 12.6 % (ref 10–15)
GFR SERPL CREATININE-BSD FRML MDRD: 74 ML/MIN/1.73M2
GLUCOSE BLD-MCNC: 91 MG/DL (ref 70–125)
GLUCOSE UR STRIP-MCNC: NEGATIVE MG/DL
HCT VFR BLD AUTO: 41.4 % (ref 40–53)
HGB BLD-MCNC: 13.6 G/DL (ref 13.3–17.7)
HGB UR QL STRIP: NEGATIVE
HIV 1+2 AB+HIV1 P24 AG SERPL QL IA: NEGATIVE
KETONES UR STRIP-MCNC: NEGATIVE MG/DL
LEUKOCYTE ESTERASE UR QL STRIP: NEGATIVE
MCH RBC QN AUTO: 30.3 PG (ref 26.5–33)
MCHC RBC AUTO-ENTMCNC: 32.9 G/DL (ref 31.5–36.5)
MCV RBC AUTO: 92 FL (ref 78–100)
NITRATE UR QL: NEGATIVE
PH UR STRIP: 5.5 [PH] (ref 5–8)
PLATELET # BLD AUTO: 244 10E3/UL (ref 150–450)
POTASSIUM BLD-SCNC: 4.6 MMOL/L (ref 3.5–5)
PROT SERPL-MCNC: 6.5 G/DL (ref 6–8)
PSA SERPL-MCNC: 1.67 UG/L (ref 0–3.5)
RBC # BLD AUTO: 4.49 10E6/UL (ref 4.4–5.9)
SODIUM SERPL-SCNC: 142 MMOL/L (ref 136–145)
SP GR UR STRIP: >=1.03 (ref 1–1.03)
UROBILINOGEN UR STRIP-ACNC: 0.2 E.U./DL
WBC # BLD AUTO: 6.8 10E3/UL (ref 4–11)

## 2022-01-19 PROCEDURE — G0103 PSA SCREENING: HCPCS | Performed by: INTERNAL MEDICINE

## 2022-01-19 PROCEDURE — 36415 COLL VENOUS BLD VENIPUNCTURE: CPT | Performed by: INTERNAL MEDICINE

## 2022-01-19 PROCEDURE — 99396 PREV VISIT EST AGE 40-64: CPT | Performed by: INTERNAL MEDICINE

## 2022-01-19 PROCEDURE — 99213 OFFICE O/P EST LOW 20 MIN: CPT | Mod: 25 | Performed by: INTERNAL MEDICINE

## 2022-01-19 PROCEDURE — 81003 URINALYSIS AUTO W/O SCOPE: CPT | Performed by: INTERNAL MEDICINE

## 2022-01-19 PROCEDURE — 80053 COMPREHEN METABOLIC PANEL: CPT | Performed by: INTERNAL MEDICINE

## 2022-01-19 PROCEDURE — 87389 HIV-1 AG W/HIV-1&-2 AB AG IA: CPT | Performed by: INTERNAL MEDICINE

## 2022-01-19 PROCEDURE — 90471 IMMUNIZATION ADMIN: CPT | Performed by: INTERNAL MEDICINE

## 2022-01-19 PROCEDURE — 85027 COMPLETE CBC AUTOMATED: CPT | Performed by: INTERNAL MEDICINE

## 2022-01-19 PROCEDURE — 90682 RIV4 VACC RECOMBINANT DNA IM: CPT | Performed by: INTERNAL MEDICINE

## 2022-01-19 PROCEDURE — 86803 HEPATITIS C AB TEST: CPT | Performed by: INTERNAL MEDICINE

## 2022-01-19 RX ORDER — SILDENAFIL 100 MG/1
50-100 TABLET, FILM COATED ORAL DAILY PRN
Qty: 6 TABLET | Refills: 3 | Status: SHIPPED | OUTPATIENT
Start: 2022-01-19 | End: 2024-08-26

## 2022-01-19 RX ORDER — FLUOCINONIDE 0.5 MG/G
CREAM TOPICAL 2 TIMES DAILY
Qty: 15 G | Refills: 1 | Status: SHIPPED | OUTPATIENT
Start: 2022-01-19 | End: 2023-11-16

## 2022-01-19 ASSESSMENT — MIFFLIN-ST. JEOR: SCORE: 1765.5

## 2022-01-19 NOTE — PROGRESS NOTES
SUBJECTIVE:   CC: Thanh Huang is an 51 year old male who presents for preventative health visit.     Rola Maldonado comes in a for physical.  He just got over COVID here a few days ago.  He was down in [place] when he got it.  He was vaccinated.  Got pretty sick though.  Required to be in the hotel for an extra week.  He continues to have a dry cough.  No shortness of breath.  He otherwise feels good although he is somewhat melancholy.  He feels like he does not have much of a support system here.  He is planning on moving to [place].  He had his house on the market but could not sell it.  He is going to put it back on in a month or 2.  Otherwise no concerns.  He is back exercising again.  He has not been smoking cigars.    He wants me to look at a couple lesions on his skin      Patient has been advised of split billing requirements and indicates understanding: Yes  Healthy Habits:     Getting at least 3 servings of Calcium per day:  Yes    Bi-annual eye exam:  Yes    Dental care twice a year:  Yes    Sleep apnea or symptoms of sleep apnea:  None    Diet:  Regular (no restrictions)    Frequency of exercise:  4-5 days/week    Duration of exercise:  Greater than 60 minutes    Taking medications regularly:  Yes    Medication side effects:  Not applicable    PHQ-2 Total Score: 2    Additional concerns today:  Yes              Today's PHQ-2 Score:   PHQ-2 ( 1999 Pfizer) 1/17/2022   Q1: Little interest or pleasure in doing things 0   Q2: Feeling down, depressed or hopeless 2   PHQ-2 Score 2   Q1: Little interest or pleasure in doing things Not at all   Q2: Feeling down, depressed or hopeless More than half the days   PHQ-2 Score 2       Abuse: Current or Past(Physical, Sexual or Emotional)- No  Do you feel safe in your environment? Yes    Have you ever done Advance Care Planning? (For example, a Health Directive, POLST, or a discussion with a medical provider or your loved ones about your wishes): No, advance  "care planning information given to patient to review.  Patient plans to discuss their wishes with loved ones or provider.      Social History     Tobacco Use     Smoking status: Light Tobacco Smoker     Types: Cigars, Cigars     Smokeless tobacco: Never Used     Tobacco comment: daily cigar smoker.. None since yesterday   Substance Use Topics     Alcohol use: Yes     Alcohol/week: 14.0 standard drinks     If you drink alcohol do you typically have >3 drinks per day or >7 drinks per week? Yes      Alcohol Use 1/17/2022   Prescreen: >3 drinks/day or >7 drinks/week? No   No flowsheet data found.    Last PSA:   Prostate Specific Antigen Screen   Date Value Ref Range Status   03/02/2015 0.7 0.0 - 2.5 ng/mL Final       Reviewed orders with patient. Reviewed health maintenance and updated orders accordingly - Yes      Reviewed and updated as needed this visit by clinical staff   Allergies  Meds             Reviewed and updated as needed this visit by Provider                   Review of Systems   ROS: 10 point ROS neg other than the symptoms noted above in the HPI.    OBJECTIVE:   /88 (BP Location: Left arm, Patient Position: Sitting, Cuff Size: Adult Small)   Pulse 86   Temp 97.8  F (36.6  C)   Ht 1.816 m (5' 11.5\")   Wt 87.5 kg (193 lb)   SpO2 97%   BMI 26.54 kg/m      Physical Exam  GENERAL: healthy, alert and no distress  EYES: Eyes grossly normal to inspection, PERRL and conjunctivae and sclerae normal  HENT: ear canals and TM's normal, nose and mouth without ulcers or lesions  NECK: no adenopathy, no asymmetry, masses, or scars and thyroid normal to palpation  RESP: lungs clear to auscultation - no rales, rhonchi or wheezes  CV: regular rate and rhythm, normal S1 S2, no S3 or S4, no murmur, click or rub, no peripheral edema and peripheral pulses strong  ABDOMEN: soft, nontender, no hepatosplenomegaly, no masses and bowel sounds normal   (male): normal male genitalia without lesions or urethral " discharge, no hernia  RECTAL: normal sphincter tone, no rectal masses, prostate normal size, smooth, nontender without nodules or masses  MS: no gross musculoskeletal defects noted, no edema  SKIN: no suspicious lesions or rashes  NEURO: Normal strength and tone, mentation intact and speech normal  PSYCH: mentation appears normal, affect normal/bright        ASSESSMENT/PLAN:   1. Routine adult health maintenance  He needs to have a colonoscopy as previously recommended.  HIV and hep C today.  PSA should be done yearly.  We do not know family history.  Tetanus will be due this year.  Flu shot updated.  He will get a COVID booster when eligible.  We discussed shingles vaccination today.  I also recommended pneumococcal vaccine once he is feeling better  - Adult Gastro Ref - Procedure Only; Future  - PSA, screen; Future  - Comprehensive metabolic panel (BMP + Alb, Alk Phos, ALT, AST, Total. Bili, TP); Future  - CBC with platelets; Future  - UA Macro with Reflex to Micro and Culture - lab collect; Future  - HIV Antigen Antibody Combo; Future  - Hepatitis C antibody; Future  - PSA, screen  - Comprehensive metabolic panel (BMP + Alb, Alk Phos, ALT, AST, Total. Bili, TP)  - CBC with platelets  - UA Macro with Reflex to Micro and Culture - lab collect  - HIV Antigen Antibody Combo  - Hepatitis C antibody    2. Erectile dysfunction, unspecified erectile dysfunction type  Good control with Viagra.  Continue same  - sildenafil (VIAGRA) 100 MG tablet; Take 0.5-1 tablets ( mg) by mouth daily as needed  Dispense: 6 tablet; Refill: 3    3. Moderate major depression (H)  I am not sure if additional medications are going to be helpful here.  It sounds like a lot of his issues would be helped with some therapy.  Refer for counseling.  - Adult Mental Health  Referral; Future    4. Generalized anxiety disorder  As above.  - Adult Mental Health  Referral; Future    5. Eczema, unspecified type  Eczema seem to be  "more problematic.  He is using the Lidex somewhat sporadically.  I recommended using it a week on to try to clear things up.  Not to use beyond that.  - fluocinonide (LIDEX) 0.05 % external cream; Apply topically 2 times daily For no more than 7 days.  Dispense: 15 g; Refill: 1    6. Infection due to 2019 novel coronavirus  He seems to be recovering okay.  Let me know if things worsen.    7. Seborrheic keratoses  He has some seborrheic keratoses over her shoulders.  Reassurance today.    8. Nicotine dependence, uncomplicated, unspecified nicotine product type  I urged smoking cessation today    Patient has been advised of split billing requirements and indicates understanding: Yes    COUNSELING:   Reviewed preventive health counseling, as reflected in patient instructions    Estimated body mass index is 26.54 kg/m  as calculated from the following:    Height as of this encounter: 1.816 m (5' 11.5\").    Weight as of this encounter: 87.5 kg (193 lb).         He reports that he has been smoking cigars and cigars. He has never used smokeless tobacco.  Tobacco Cessation Action Plan:   Self help information given to patient      Counseling Resources:  ATP IV Guidelines  Pooled Cohorts Equation Calculator  FRAX Risk Assessment  ICSI Preventive Guidelines  Dietary Guidelines for Americans, 2010  USDA's MyPlate  ASA Prophylaxis  Lung CA Screening    UMAIR CHEATHAM MD  [facility]  "

## 2022-01-19 NOTE — LETTER
January 21, 2022      Bryantbimallissy NIKKI Sal  [address] [place]  [place] [facility] [place] [address]        Dear ,    We are writing to inform you of your test results.    Thanh, your labs all look good, except for a couple elevated liver enzymes.  I don't know if this could be due to your covid, or if you have been drinking alcohol.  I would like you to abstain from alcohol for a month, and have these rechecked at that time.         Resulted Orders   PSA, screen   Result Value Ref Range    Prostate Specific Antigen Screen 1.67 0.00 - 3.50 ug/L    Narrative    Assay Method is Abbott Prostate-Specific Antigen (PSA)  Standard-WHO 1st International (90:10)   Comprehensive metabolic panel (BMP + Alb, Alk Phos, ALT, AST, Total. Bili, TP)   Result Value Ref Range    Sodium 142 136 - 145 mmol/L    Potassium 4.6 3.5 - 5.0 mmol/L    Chloride 106 98 - 107 mmol/L    Carbon Dioxide (CO2) 23 22 - 31 mmol/L    Anion Gap 13 5 - 18 mmol/L    Urea Nitrogen 18 8 - 22 mg/dL    Creatinine 1.19 0.70 - 1.30 mg/dL    Calcium 9.5 8.5 - 10.5 mg/dL    Glucose 91 70 - 125 mg/dL    Alkaline Phosphatase 85 45 - 120 U/L     (H) 0 - 40 U/L    ALT 56 (H) 0 - 45 U/L    Protein Total 6.5 6.0 - 8.0 g/dL    Albumin 4.1 3.5 - 5.0 g/dL    Bilirubin Total 0.3 0.0 - 1.0 mg/dL    GFR Estimate 74 >60 mL/min/1.73m2      Comment:      Effective December 21, 2021 eGFRcr in adults is calculated using the 2021 CKD-EPI creatinine equation which includes age and gender (Isaiah pena al., NEJ, DOI: 10.1056/AMXUkj6281893)   CBC with platelets   Result Value Ref Range    WBC Count 6.8 4.0 - 11.0 10e3/uL    RBC Count 4.49 4.40 - 5.90 10e6/uL    Hemoglobin 13.6 13.3 - 17.7 g/dL    Hematocrit 41.4 40.0 - 53.0 %    MCV 92 78 - 100 fL    MCH 30.3 26.5 - 33.0 pg    MCHC 32.9 31.5 - 36.5 g/dL    RDW 12.6 10.0 - 15.0 %    Platelet Count 244 150 - 450 10e3/uL   UA Macro with Reflex to Micro and Culture - lab collect   Result Value Ref Range    Color Urine Yellow Colorless,  Straw, Light Yellow, Yellow    Appearance Urine Clear Clear    Glucose Urine Negative Negative mg/dL    Bilirubin Urine Negative Negative    Ketones Urine Negative Negative mg/dL    Specific Gravity Urine >=1.030 1.005 - 1.030    Blood Urine Negative Negative    pH Urine 5.5 5.0 - 8.0    Protein Albumin Urine Negative Negative mg/dL    Urobilinogen Urine 0.2 0.2, 1.0 E.U./dL    Nitrite Urine Negative Negative    Leukocyte Esterase Urine Negative Negative    Narrative    Microscopic not indicated   HIV Antigen Antibody Combo   Result Value Ref Range    HIV Antigen Antibody Combo Negative Negative   Hepatitis C antibody   Result Value Ref Range    Hepatitis C Antibody Negative Negative    Narrative    Assay performance characteristics have not been established for newborns, infants, children (<18 years) or populations of immunocompromised or immunosuppressed patients.        If you have any questions or concerns, please call the clinic at the number listed above.       Sincerely,      Janusz Romeo MD

## 2022-01-19 NOTE — PATIENT INSTRUCTIONS
HOW TO QUIT SMOKING  Smoking is one of the hardest habits to break. About half of all those who have ever smoked have been able to quit, and most of those (about 70%) who still smoke want to quit. Here are some of the best ways to stop smoking.     KEEP TRYING:  It takes most smokers about 8 tries before they are finally able to fully quit. So, the more often you try and fail, the better your chance of quitting the next time! So, don't give up!    GO COLD TURKEY:  Most ex-smokers quit cold turkey. Trying to cut back gradually doesn't seem to work as well, perhaps because it continues the smoking habit. Also, it is possible to fool yourself by inhaling more while smoking fewer cigarettes. This results in the same amount of nicotine in your body!    GET SUPPORT:  Support programs can make an important difference, especially for the heavy smoker. These groups offer lectures, methods to change your behavior and peer support. Call the free national Quitline for more information. 800-QUIT-NOW (843-109-3704). Low-cost or free programs are offered by many hospitals, local chapters of the American Lung Association (904-361-0967) and the American Cancer Society (465-635-3713). Support at home is important too. Non-smokers can help by offering praise and encouragement. If the smoker fails to quit, encourage them to try again!    OVER-THE-COUNTER MEDICINES:  For those who can't quit on their own, Nicotine Replacement Therapy (NRT) may make quitting much easier. Certain aids such as the nicotine patch, gum and lozenge are available without a prescription. However, it is best to use these under the guidance of your doctor. The skin patch provides a steady supply of nicotine to the body. Nicotine gum and lozenge gives temporary bursts of low levels of nicotine. Both methods take the edge off the craving for cigarettes. WARNING: If you feel symptoms of nicotine overdose, such as nausea, vomiting, dizziness, weakness, or fast  heartbeat, stop using these and see your doctor.    PRESCRIPTION MEDICINES:  After evaluating your smoking patterns and prior attempts at quitting, your doctor may offer a prescription medicine such as bupropion (Zyban, Wellbutrin), varenicline (Chantix, Champix), a niocotine inhaler or nasal spray. Each has its unique advantage and side effects which your doctor can review with you.    HEALTH BENEFITS OF QUITTING:  The benefits of quitting start right away and keep improving the longer you go without smokin minutes: blood pressure and pulse return to normal  8 hours: oxygen levels return to normal  2 days: ability to smell and taste begins to improve as damaged nerves start to regrow  2-3 weeks: circulation and lung function improves  1-9 months: decreased cough, congestion and shortness of breath; less tired  1 year: risk of heart attack decreases by half  5 years: risk of lung cancer decreases by half; risk of stroke becomes the same as a non-smoker  For information about how to quit smoking, visit the following links:  [employer] ,   Clearing the Air, Quit Smoking Today   - an online booklet. http://www.smokefree.gov/pubs/clearing_the_air.pdf  Smokefree.gov http://smokefree.gov/  QuitNet http://www.quitnet.com/    1386-4741 Margaux Quevedo, [address] [place] [address], [place] [address]. All rights reserved. This information is not intended as a substitute for professional medical care. Always follow your healthcare professional's instructions.

## 2022-01-20 LAB — HCV AB SERPL QL IA: NEGATIVE

## 2022-01-20 ASSESSMENT — PATIENT HEALTH QUESTIONNAIRE - PHQ9: 5. POOR APPETITE OR OVEREATING: SEVERAL DAYS

## 2022-01-20 ASSESSMENT — ANXIETY QUESTIONNAIRES
IF YOU CHECKED OFF ANY PROBLEMS ON THIS QUESTIONNAIRE, HOW DIFFICULT HAVE THESE PROBLEMS MADE IT FOR YOU TO DO YOUR WORK, TAKE CARE OF THINGS AT HOME, OR GET ALONG WITH OTHER PEOPLE: SOMEWHAT DIFFICULT
7. FEELING AFRAID AS IF SOMETHING AWFUL MIGHT HAPPEN: NOT AT ALL
3. WORRYING TOO MUCH ABOUT DIFFERENT THINGS: NOT AT ALL
GAD7 TOTAL SCORE: 7
2. NOT BEING ABLE TO STOP OR CONTROL WORRYING: MORE THAN HALF THE DAYS
1. FEELING NERVOUS, ANXIOUS, OR ON EDGE: NEARLY EVERY DAY
5. BEING SO RESTLESS THAT IT IS HARD TO SIT STILL: NOT AT ALL
6. BECOMING EASILY ANNOYED OR IRRITABLE: SEVERAL DAYS

## 2022-01-21 ASSESSMENT — ANXIETY QUESTIONNAIRES: GAD7 TOTAL SCORE: 7

## 2022-03-02 ENCOUNTER — LAB (OUTPATIENT)
Dept: LAB | Facility: CLINIC | Age: 52
End: 2022-03-02
Payer: COMMERCIAL

## 2022-03-02 DIAGNOSIS — R74.01 ELEVATED TRANSAMINASE LEVEL: ICD-10-CM

## 2022-03-02 LAB
ALBUMIN SERPL-MCNC: 4.1 G/DL (ref 3.5–5)
ALP SERPL-CCNC: 71 U/L (ref 45–120)
ALT SERPL W P-5'-P-CCNC: 18 U/L (ref 0–45)
AST SERPL W P-5'-P-CCNC: 20 U/L (ref 0–40)
BILIRUB DIRECT SERPL-MCNC: 0.1 MG/DL
BILIRUB SERPL-MCNC: 0.3 MG/DL (ref 0–1)
PROT SERPL-MCNC: 6.5 G/DL (ref 6–8)

## 2022-03-02 PROCEDURE — 36415 COLL VENOUS BLD VENIPUNCTURE: CPT

## 2022-03-02 PROCEDURE — 80076 HEPATIC FUNCTION PANEL: CPT

## 2022-03-17 ENCOUNTER — TRANSFERRED RECORDS (OUTPATIENT)
Dept: HEALTH INFORMATION MANAGEMENT | Facility: CLINIC | Age: 52
End: 2022-03-17
Payer: COMMERCIAL

## 2022-03-18 ENCOUNTER — TRANSFERRED RECORDS (OUTPATIENT)
Dept: HEALTH INFORMATION MANAGEMENT | Facility: CLINIC | Age: 52
End: 2022-03-18
Payer: COMMERCIAL

## 2022-07-09 DIAGNOSIS — F32.1 MODERATE MAJOR DEPRESSION (H): ICD-10-CM

## 2022-07-10 RX ORDER — BUPROPION HYDROCHLORIDE 300 MG/1
TABLET ORAL
Qty: 90 TABLET | Refills: 1 | OUTPATIENT
Start: 2022-07-10

## 2022-08-25 ENCOUNTER — TRANSFERRED RECORDS (OUTPATIENT)
Dept: HEALTH INFORMATION MANAGEMENT | Facility: CLINIC | Age: 52
End: 2022-08-25

## 2022-09-25 ENCOUNTER — HEALTH MAINTENANCE LETTER (OUTPATIENT)
Age: 52
End: 2022-09-25

## 2022-10-24 DIAGNOSIS — F32.1 MODERATE MAJOR DEPRESSION (H): ICD-10-CM

## 2022-10-24 RX ORDER — BUPROPION HYDROCHLORIDE 300 MG/1
300 TABLET ORAL EVERY MORNING
Qty: 90 TABLET | Refills: 1 | Status: SHIPPED | OUTPATIENT
Start: 2022-10-24 | End: 2023-05-02

## 2022-10-24 NOTE — TELEPHONE ENCOUNTER
Medication Question or Refill        What medication are you calling about (include dose and sig)?:   Bupropion 300mg 1 tab daily each morning #90    Controlled Substance Agreement on file:   CSA -- Patient Level:    CSA: None found at the patient level.       Who prescribed the medication?: n/a    Do you need a refill? Yes:     When did you use the medication last? n/a    Patient offered an appointment? No    Do you have any questions or concerns?  No    Preferred Pharmacy:   [facility] [id number] [place] [address] [address] [facility] [address] [place] [address] [address] [place] [address]  Phone: [phone] Fax: [phone]      Could we send this information to you in [place] or would you prefer to receive a phone call?:   Patient would prefer a phone call   Okay to leave a detailed message?: Yes at Home number on file [phone] (home)

## 2023-01-28 DIAGNOSIS — F32.1 MODERATE MAJOR DEPRESSION (H): ICD-10-CM

## 2023-01-29 RX ORDER — BUPROPION HYDROCHLORIDE 300 MG/1
TABLET ORAL
Qty: 90 TABLET | Refills: 1 | OUTPATIENT
Start: 2023-01-29

## 2023-02-27 ENCOUNTER — TRANSFERRED RECORDS (OUTPATIENT)
Dept: HEALTH INFORMATION MANAGEMENT | Facility: CLINIC | Age: 53
End: 2023-02-27

## 2023-05-01 DIAGNOSIS — F32.1 MODERATE MAJOR DEPRESSION (H): ICD-10-CM

## 2023-05-02 RX ORDER — BUPROPION HYDROCHLORIDE 300 MG/1
300 TABLET ORAL EVERY MORNING
Qty: 90 TABLET | Refills: 1 | OUTPATIENT
Start: 2023-05-02

## 2023-05-02 RX ORDER — BUPROPION HYDROCHLORIDE 300 MG/1
300 TABLET ORAL EVERY MORNING
Qty: 90 TABLET | Refills: 0 | Status: SHIPPED | OUTPATIENT
Start: 2023-05-02 | End: 2023-07-31

## 2023-05-02 NOTE — TELEPHONE ENCOUNTER
If he schedules a preventative visit, it should be free for him.  I will not be able to continue to prescribe medications without a visit.  We will send 90 days of med to pharm.

## 2023-05-02 NOTE — TELEPHONE ENCOUNTER
"Routing refill request to provider for review/approval because:  Labs not current:  PHQ9  Patient needs to be seen because it has been more than 6 months since last office visit.    Last Written Prescription Date:  10/24/22  Last Fill Quantity: 90,  # refills: 1   Last office visit provider:  1/19/22     Requested Prescriptions   Pending Prescriptions Disp Refills     buPROPion (WELLBUTRIN XL) 300 MG 24 hr tablet 90 tablet 1     Sig: Take 1 tablet (300 mg) by mouth every morning       SSRIs Protocol Failed - 5/2/2023  2:59 AM        Failed - PHQ-9 score less than 5 in past 6 months     Please review last PHQ-9 score.           Failed - Recent (6 mo) or future (30 days) visit within the authorizing provider's specialty     Patient had office visit in the last 6 months or has a visit in the next 30 days with authorizing provider or within the authorizing provider's specialty.  See \"Patient Info\" tab in inbasket, or \"Choose Columns\" in Meds & Orders section of the refill encounter.            Passed - Medication is Bupropion     If the medication is Bupropion (Wellbutrin), and the patient is taking for smoking cessation; OK to refill.          Passed - Medication is active on med list        Passed - Patient is age 18 or older             Suzanne Paredes RN 05/02/23 2:59 AM  "

## 2023-05-02 NOTE — TELEPHONE ENCOUNTER
Patient called back and stated he is still living in [place]. Patient does not want to schedule for an appointment as he states he does not want to pay for a bill if he doesn't need to come in since he is not sick. Patient states he only have 2 days left of his medication and would like this to be send to [facility] on [address]. Please advise.

## 2023-05-02 NOTE — TELEPHONE ENCOUNTER
05/02 I called and left a message for pt to call and let us know [place] or not if so needs to schedule a physical to be able to fill medication if moved away please let us know, thanks

## 2023-05-13 ENCOUNTER — HEALTH MAINTENANCE LETTER (OUTPATIENT)
Age: 53
End: 2023-05-13

## 2023-07-27 DIAGNOSIS — F32.1 MODERATE MAJOR DEPRESSION (H): ICD-10-CM

## 2023-07-27 RX ORDER — BUPROPION HYDROCHLORIDE 300 MG/1
TABLET ORAL
Qty: 90 TABLET | Refills: 0 | OUTPATIENT
Start: 2023-07-27

## 2023-07-27 NOTE — TELEPHONE ENCOUNTER
Lm on vm of rx refill refusal, and to call back and schedule appt, physical should be free to him and he is due so he can either schedule PE or OV.

## 2023-07-27 NOTE — TELEPHONE ENCOUNTER
"Routing refill request to provider for review/approval because:  Labs not current:  PHQ-9  Patient needs to be seen because it has been more than 6 months since last office visit.    Last Written Prescription Date:  5/2/23  Last Fill Quantity: 90,  # refills: 0   Last office visit provider:   1/19/22    Requested Prescriptions   Pending Prescriptions Disp Refills    buPROPion (WELLBUTRIN XL) 300 MG 24 hr tablet [Pharmacy Med Name: BUPROPION XL 300MG TABLETS] 90 tablet 0     Sig: TAKE 1 TABLET(300 MG) BY MOUTH EVERY MORNING       SSRIs Protocol Failed - 7/27/2023 11:16 AM        Failed - PHQ-9 score less than 5 in past 6 months     Please review last PHQ-9 score.           Failed - Recent (6 mo) or future (30 days) visit within the authorizing provider's specialty     Patient had office visit in the last 6 months or has a visit in the next 30 days with authorizing provider or within the authorizing provider's specialty.  See \"Patient Info\" tab in inbasket, or \"Choose Columns\" in Meds & Orders section of the refill encounter.            Passed - Medication is Bupropion     If the medication is Bupropion (Wellbutrin), and the patient is taking for smoking cessation; OK to refill.          Passed - Medication is active on med list        Passed - Patient is age 18 or older             Carolina Mckenzie RN 07/27/23 3:43 PM  "

## 2023-07-28 NOTE — TELEPHONE ENCOUNTER
Pt called in and we did schedule a physical for 9/5.    Can he get medications until his appt to hold him over?

## 2023-07-31 RX ORDER — BUPROPION HYDROCHLORIDE 300 MG/1
300 TABLET ORAL EVERY MORNING
Qty: 60 TABLET | Refills: 0 | Status: SHIPPED | OUTPATIENT
Start: 2023-07-31 | End: 2023-10-04

## 2023-08-08 ENCOUNTER — TRANSFERRED RECORDS (OUTPATIENT)
Dept: HEALTH INFORMATION MANAGEMENT | Facility: CLINIC | Age: 53
End: 2023-08-08
Payer: COMMERCIAL

## 2023-10-04 ENCOUNTER — NURSE TRIAGE (OUTPATIENT)
Dept: NURSING | Facility: CLINIC | Age: 53
End: 2023-10-04
Payer: COMMERCIAL

## 2023-10-04 NOTE — TELEPHONE ENCOUNTER
Patient is returning call from clinic today after message left.  Patient is on Bupropion, refill is due, pharmacy stated refill was decline by Dr. Romeo, as patient is due for a physical but had to reschedule twice due to work constraints.  Patient now has appointment scheduled 11/16/23 with Dr. Romeo.  Attempted to check for closer appointment but none available.  Noted Dr. Romeo's message (see below) for a 30 day refill, which will get him to the November appointment, and will fill medication at [facility] in [place]. Routed message to clinic pool in previous message thread.          Reason for Disposition   [1] Caller requesting a prescription renewal (no refills left), no triage required, AND [2] triager able to renew prescription per department policy    Additional Information   Negative: [1] Prescription refill request for ESSENTIAL medicine (i.e., likelihood of harm to patient if not taken) AND [2] triager unable to refill per department policy   Negative: [1] Prescription not at pharmacy AND [2] was prescribed by PCP recently  (Exception: Triager has access to EMR and prescription is recorded there. Go to Home Care and confirm for pharmacy.)   Negative: [1] Pharmacy calling with prescription questions AND [2] triager unable to answer question   Negative: Prescription request for new medicine (not a refill)   Negative: Caller requesting a CONTROLLED substance prescription refill (e.g., narcotics, ADHD medicines)   Negative: [1] Prescription refill request for NON-ESSENTIAL medicine (i.e., no harm to patient if med not taken) AND [2] triager unable to refill per department policy   Negative: [1] Caller has NON-URGENT medicine question about med that PCP prescribed AND [2] triager unable to answer question   Negative: [1] Prescription prescribed recently is not at pharmacy AND [2] triager has access to patient's EMR AND [3] prescription is recorded in the EMR    Protocols used: Medication  Refill and Renewal Call-A-AH

## 2023-11-16 ENCOUNTER — OFFICE VISIT (OUTPATIENT)
Dept: INTERNAL MEDICINE | Facility: CLINIC | Age: 53
End: 2023-11-16
Payer: COMMERCIAL

## 2023-11-16 VITALS
BODY MASS INDEX: 28.98 KG/M2 | TEMPERATURE: 98.8 F | DIASTOLIC BLOOD PRESSURE: 97 MMHG | RESPIRATION RATE: 11 BRPM | WEIGHT: 207 LBS | SYSTOLIC BLOOD PRESSURE: 145 MMHG | OXYGEN SATURATION: 96 % | HEART RATE: 83 BPM | HEIGHT: 71 IN

## 2023-11-16 DIAGNOSIS — M25.561 CHRONIC PAIN OF RIGHT KNEE: ICD-10-CM

## 2023-11-16 DIAGNOSIS — F32.1 MODERATE MAJOR DEPRESSION (H): ICD-10-CM

## 2023-11-16 DIAGNOSIS — G89.29 CHRONIC PAIN OF RIGHT KNEE: ICD-10-CM

## 2023-11-16 DIAGNOSIS — R63.5 WEIGHT GAIN: ICD-10-CM

## 2023-11-16 DIAGNOSIS — R03.0 ELEVATED BLOOD PRESSURE READING WITHOUT DIAGNOSIS OF HYPERTENSION: ICD-10-CM

## 2023-11-16 DIAGNOSIS — L30.9 ECZEMA, UNSPECIFIED TYPE: ICD-10-CM

## 2023-11-16 DIAGNOSIS — Z00.00 ROUTINE ADULT HEALTH MAINTENANCE: Primary | ICD-10-CM

## 2023-11-16 PROBLEM — K61.1 PERIRECTAL ABSCESS: Status: RESOLVED | Noted: 2017-07-26 | Resolved: 2023-11-16

## 2023-11-16 PROBLEM — K61.2 ANORECTAL ABSCESS: Status: RESOLVED | Noted: 2017-07-21 | Resolved: 2023-11-16

## 2023-11-16 LAB
ALBUMIN SERPL BCG-MCNC: 4.4 G/DL (ref 3.5–5.2)
ALBUMIN UR-MCNC: NEGATIVE MG/DL
ALP SERPL-CCNC: 72 U/L (ref 40–150)
ALT SERPL W P-5'-P-CCNC: 27 U/L (ref 0–70)
ANION GAP SERPL CALCULATED.3IONS-SCNC: 9 MMOL/L (ref 7–15)
APPEARANCE UR: CLEAR
AST SERPL W P-5'-P-CCNC: 21 U/L (ref 0–45)
BASOPHILS # BLD AUTO: 0 10E3/UL (ref 0–0.2)
BASOPHILS NFR BLD AUTO: 0 %
BILIRUB SERPL-MCNC: 0.3 MG/DL
BILIRUB UR QL STRIP: NEGATIVE
BUN SERPL-MCNC: 15.7 MG/DL (ref 6–20)
CALCIUM SERPL-MCNC: 8.7 MG/DL (ref 8.6–10)
CHLORIDE SERPL-SCNC: 107 MMOL/L (ref 98–107)
CHOLEST SERPL-MCNC: 224 MG/DL
COLOR UR AUTO: YELLOW
CREAT SERPL-MCNC: 1.18 MG/DL (ref 0.67–1.17)
DEPRECATED HCO3 PLAS-SCNC: 25 MMOL/L (ref 22–29)
EGFRCR SERPLBLD CKD-EPI 2021: 74 ML/MIN/1.73M2
EOSINOPHIL # BLD AUTO: 0.2 10E3/UL (ref 0–0.7)
EOSINOPHIL NFR BLD AUTO: 3 %
ERYTHROCYTE [DISTWIDTH] IN BLOOD BY AUTOMATED COUNT: 12.4 % (ref 10–15)
GLUCOSE SERPL-MCNC: 106 MG/DL (ref 70–99)
GLUCOSE UR STRIP-MCNC: NEGATIVE MG/DL
HCT VFR BLD AUTO: 41.2 % (ref 40–53)
HDLC SERPL-MCNC: 50 MG/DL
HGB BLD-MCNC: 14 G/DL (ref 13.3–17.7)
HGB UR QL STRIP: NEGATIVE
IMM GRANULOCYTES # BLD: 0 10E3/UL
IMM GRANULOCYTES NFR BLD: 1 %
KETONES UR STRIP-MCNC: NEGATIVE MG/DL
LDLC SERPL CALC-MCNC: 136 MG/DL
LEUKOCYTE ESTERASE UR QL STRIP: NEGATIVE
LYMPHOCYTES # BLD AUTO: 1.6 10E3/UL (ref 0.8–5.3)
LYMPHOCYTES NFR BLD AUTO: 33 %
MCH RBC QN AUTO: 30.4 PG (ref 26.5–33)
MCHC RBC AUTO-ENTMCNC: 34 G/DL (ref 31.5–36.5)
MCV RBC AUTO: 90 FL (ref 78–100)
MONOCYTES # BLD AUTO: 0.5 10E3/UL (ref 0–1.3)
MONOCYTES NFR BLD AUTO: 10 %
NEUTROPHILS # BLD AUTO: 2.6 10E3/UL (ref 1.6–8.3)
NEUTROPHILS NFR BLD AUTO: 52 %
NITRATE UR QL: NEGATIVE
NONHDLC SERPL-MCNC: 174 MG/DL
PH UR STRIP: 7 [PH] (ref 5–8)
PLATELET # BLD AUTO: 176 10E3/UL (ref 150–450)
POTASSIUM SERPL-SCNC: 4.7 MMOL/L (ref 3.4–5.3)
PROT SERPL-MCNC: 6.7 G/DL (ref 6.4–8.3)
PSA SERPL DL<=0.01 NG/ML-MCNC: 0.65 NG/ML (ref 0–3.5)
RBC # BLD AUTO: 4.6 10E6/UL (ref 4.4–5.9)
SODIUM SERPL-SCNC: 141 MMOL/L (ref 135–145)
SP GR UR STRIP: 1.02 (ref 1–1.03)
TRIGL SERPL-MCNC: 191 MG/DL
TSH SERPL DL<=0.005 MIU/L-ACNC: 2.9 UIU/ML (ref 0.3–4.2)
UROBILINOGEN UR STRIP-ACNC: 0.2 E.U./DL
VIT D+METAB SERPL-MCNC: 19 NG/ML (ref 20–50)
WBC # BLD AUTO: 4.9 10E3/UL (ref 4–11)

## 2023-11-16 PROCEDURE — 90471 IMMUNIZATION ADMIN: CPT | Performed by: INTERNAL MEDICINE

## 2023-11-16 PROCEDURE — 84443 ASSAY THYROID STIM HORMONE: CPT | Performed by: INTERNAL MEDICINE

## 2023-11-16 PROCEDURE — 90682 RIV4 VACC RECOMBINANT DNA IM: CPT | Performed by: INTERNAL MEDICINE

## 2023-11-16 PROCEDURE — 80061 LIPID PANEL: CPT | Performed by: INTERNAL MEDICINE

## 2023-11-16 PROCEDURE — 81003 URINALYSIS AUTO W/O SCOPE: CPT | Performed by: INTERNAL MEDICINE

## 2023-11-16 PROCEDURE — 99396 PREV VISIT EST AGE 40-64: CPT | Mod: 25 | Performed by: INTERNAL MEDICINE

## 2023-11-16 PROCEDURE — 80053 COMPREHEN METABOLIC PANEL: CPT | Performed by: INTERNAL MEDICINE

## 2023-11-16 PROCEDURE — G0103 PSA SCREENING: HCPCS | Performed by: INTERNAL MEDICINE

## 2023-11-16 PROCEDURE — 36415 COLL VENOUS BLD VENIPUNCTURE: CPT | Performed by: INTERNAL MEDICINE

## 2023-11-16 PROCEDURE — 85025 COMPLETE CBC W/AUTO DIFF WBC: CPT | Performed by: INTERNAL MEDICINE

## 2023-11-16 PROCEDURE — 82306 VITAMIN D 25 HYDROXY: CPT | Mod: GZ | Performed by: INTERNAL MEDICINE

## 2023-11-16 RX ORDER — BETAMETHASONE DIPROPIONATE 0.05 %
1 OINTMENT (GRAM) TOPICAL
COMMUNITY
End: 2024-04-01

## 2023-11-16 RX ORDER — BUPROPION HYDROCHLORIDE 300 MG/1
300 TABLET ORAL EVERY MORNING
Qty: 30 TABLET | Refills: 0 | Status: SHIPPED | OUTPATIENT
Start: 2023-11-16 | End: 2023-12-18

## 2023-11-16 RX ORDER — FLUOCINONIDE 0.5 MG/G
CREAM TOPICAL 2 TIMES DAILY
Qty: 15 G | Refills: 1 | Status: SHIPPED | OUTPATIENT
Start: 2023-11-16 | End: 2024-03-11

## 2023-11-16 ASSESSMENT — ENCOUNTER SYMPTOMS
COUGH: 0
EYE PAIN: 0
CONSTIPATION: 0
FEVER: 0
NAUSEA: 0
NERVOUS/ANXIOUS: 0
HEMATOCHEZIA: 0
FREQUENCY: 0
SORE THROAT: 0
SHORTNESS OF BREATH: 0
CHILLS: 0
HEADACHES: 0
HEARTBURN: 0
DIZZINESS: 0
PARESTHESIAS: 0
MYALGIAS: 0
PALPITATIONS: 0
WEAKNESS: 0
DYSURIA: 0
ARTHRALGIAS: 1
JOINT SWELLING: 0
HEMATURIA: 0
ABDOMINAL PAIN: 0
DIARRHEA: 0

## 2023-11-16 ASSESSMENT — ANXIETY QUESTIONNAIRES
IF YOU CHECKED OFF ANY PROBLEMS ON THIS QUESTIONNAIRE, HOW DIFFICULT HAVE THESE PROBLEMS MADE IT FOR YOU TO DO YOUR WORK, TAKE CARE OF THINGS AT HOME, OR GET ALONG WITH OTHER PEOPLE: NOT DIFFICULT AT ALL
GAD7 TOTAL SCORE: 2
1. FEELING NERVOUS, ANXIOUS, OR ON EDGE: NOT AT ALL
GAD7 TOTAL SCORE: 2
7. FEELING AFRAID AS IF SOMETHING AWFUL MIGHT HAPPEN: NOT AT ALL
6. BECOMING EASILY ANNOYED OR IRRITABLE: SEVERAL DAYS
3. WORRYING TOO MUCH ABOUT DIFFERENT THINGS: SEVERAL DAYS
2. NOT BEING ABLE TO STOP OR CONTROL WORRYING: NOT AT ALL
5. BEING SO RESTLESS THAT IT IS HARD TO SIT STILL: NOT AT ALL

## 2023-11-16 ASSESSMENT — PAIN SCALES - GENERAL: PAINLEVEL: NO PAIN (0)

## 2023-11-16 ASSESSMENT — PATIENT HEALTH QUESTIONNAIRE - PHQ9
10. IF YOU CHECKED OFF ANY PROBLEMS, HOW DIFFICULT HAVE THESE PROBLEMS MADE IT FOR YOU TO DO YOUR WORK, TAKE CARE OF THINGS AT HOME, OR GET ALONG WITH OTHER PEOPLE: SOMEWHAT DIFFICULT
5. POOR APPETITE OR OVEREATING: NOT AT ALL
SUM OF ALL RESPONSES TO PHQ QUESTIONS 1-9: 6
SUM OF ALL RESPONSES TO PHQ QUESTIONS 1-9: 6

## 2023-11-16 NOTE — PROGRESS NOTES
"SUBJECTIVE:   Thanh is a 53 year old, presenting for the following:  Physical (Weight management - pt reports having gained weight), Knee Pain (Right knee pain - when running or going up stairs \"acts up\" , pt reports \"minor joint pain in hands in the morning occasionally\" ), and Recheck Medication (Would like to go back on Lidex)        2023     8:30 AM   Additional Questions   Roomed by Trini       Healthy Habits:     Getting at least 3 servings of Calcium per day:  NO    Bi-annual eye exam:  Yes    Dental care twice a year:  Yes    Sleep apnea or symptoms of sleep apnea:  None    Diet:  Regular (no restrictions)    Frequency of exercise:  2-3 days/week    Duration of exercise:  Greater than 60 minutes    Taking medications regularly:  Yes    Medication side effects:  Not applicable    Additional concerns today:  No  Knee Pain  Associated symptoms include arthralgias. Pertinent negatives include no abdominal pain, chest pain, chills, congestion, coughing, fever, headaches, joint swelling, myalgias, nausea, rash, sore throat or weakness.       Today's PHQ-9 Score:       2023     7:31 AM   PHQ-9 SCORE   PHQ-9 Total Score MyChart 6 (Mild depression)   PHQ-9 Total Score 6                 Thanh comes in today for physical.  I have not seen him in a few years.  Pleasant gentleman has been pretty healthy.  He has gained a considerable mount of weight.  He believes it is due to eating too much.  He has a few cocktails at night to it sounds like.  Not exercising nearly like what he used to.  Maybe just 1 day a week.  Has been busy with work.  Travels quite a bit.  Has a girlfriend now.  She is living with them.  They have been together for a couple years now.  Mother .  He still fairly estranged from his brother.  Patient himself is adopted.      Social History     Tobacco Use    Smoking status: Light Smoker     Types: Cigars    Smokeless tobacco: Never   Substance Use Topics    Alcohol use: " "Yes     Alcohol/week: 14.0 standard drinks of alcohol             11/16/2023     7:34 AM   Alcohol Use   Prescreen: >3 drinks/day or >7 drinks/week? Yes   AUDIT SCORE  4       Last PSA:   Prostate Specific Antigen Screen   Date Value Ref Range Status   01/19/2022 1.67 0.00 - 3.50 ug/L Final       Reviewed orders with patient. Reviewed health maintenance and updated orders accordingly -       Reviewed and updated as needed this visit by clinical staff   Tobacco  Allergies  Meds              Reviewed and updated as needed this visit by Provider                     Review of Systems   Constitutional:  Negative for chills and fever.   HENT:  Negative for congestion, ear pain, hearing loss and sore throat.    Eyes:  Negative for pain and visual disturbance.   Respiratory:  Negative for cough and shortness of breath.    Cardiovascular:  Negative for chest pain, palpitations and peripheral edema.   Gastrointestinal:  Negative for abdominal pain, constipation, diarrhea, heartburn, hematochezia and nausea.   Genitourinary:  Negative for dysuria, frequency, genital sores, hematuria, impotence, penile discharge and urgency.   Musculoskeletal:  Positive for arthralgias. Negative for joint swelling and myalgias.   Skin:  Negative for rash.   Neurological:  Negative for dizziness, weakness, headaches and paresthesias.   Psychiatric/Behavioral:  Negative for mood changes. The patient is not nervous/anxious.          OBJECTIVE:   BP (!) 145/97 (BP Location: Left arm, Patient Position: Sitting, Cuff Size: Adult Regular)   Pulse 83   Temp 98.8  F (37.1  C) (Tympanic)   Resp 11   Ht 1.816 m (5' 11.5\")   Wt 93.9 kg (207 lb)   SpO2 96%   BMI 28.47 kg/m      Physical Exam  GENERAL: Somewhat pale.  He has gained a little weight  EYES: Eyes grossly normal to inspection, PERRL and conjunctivae and sclerae normal  HENT: ear canals and TM's normal, nose and mouth without ulcers or lesions  NECK: no adenopathy, no asymmetry, masses, " or scars and thyroid normal to palpation  RESP: lungs clear to auscultation - no rales, rhonchi or wheezes  CV: regular rate and rhythm, normal S1 S2, no S3 or S4, no murmur, click or rub, no peripheral edema and peripheral pulses strong  ABDOMEN: soft, nontender, no hepatosplenomegaly, no masses and bowel sounds normal  MS: no gross musculoskeletal defects noted, no edema  SKIN: no suspicious lesions or rashes  NEURO: Normal strength and tone, mentation intact and speech normal  PSYCH: mentation appears normal, affect normal/bright        ASSESSMENT/PLAN:   1. Routine adult health maintenance  Discussed getting rid of the alcohol.  Increase exercise.  Eating healthier and a modest weight loss.  Labs as below.  - Lipid panel reflex to direct LDL Non-fasting; Future  - Comprehensive metabolic panel (BMP + Alb, Alk Phos, ALT, AST, Total. Bili, TP); Future  - UA Macroscopic with reflex to Microscopic and Culture - Lab Collect; Future  - PSA, screen; Future  - TSH with free T4 reflex; Future  - CBC with platelets and differential; Future  - Vitamin D Deficiency; Future    2. Moderate major depression (H)  Controlled with Wellbutrin reports.  Continue same.  - buPROPion (WELLBUTRIN XL) 300 MG 24 hr tablet; Take 1 tablet (300 mg) by mouth every morning  Dispense: 30 tablet; Refill: 0    3. Eczema, unspecified type  He prefers the Lidex to what he has been given by his dermatologist so I renewed that.  - fluocinonide (LIDEX) 0.05 % external cream; Apply topically 2 times daily For no more than 7 days.  Dispense: 15 g; Refill: 1    4. Weight gain  As above work on weight loss    5. Elevated blood pressure reading without diagnosis of hypertension  Blood pressure quite high.  We discussed salt restriction alcohol restriction and weight loss with exercise.  He will return in a month for blood pressure check    6. Chronic pain of right knee  Refer to orthopedics for evaluation.  Try to avoid nonsteroidals.  - Orthopedic  " Referral; Future     Patient has been advised of split billing requirements and indicates understanding: Yes      COUNSELING:   Reviewed preventive health counseling, as reflected in patient instructions      BMI:   Estimated body mass index is 28.47 kg/m  as calculated from the following:    Height as of this encounter: 1.816 m (5' 11.5\").    Weight as of this encounter: 93.9 kg (207 lb).         He reports that he has been smoking cigars. He has never used smokeless tobacco.  Nicotine/Tobacco Cessation Plan:   Information offered: Patient not interested at this time            UMAIR CHEATHAM MD  [facility]        "

## 2023-12-12 ENCOUNTER — ALLIED HEALTH/NURSE VISIT (OUTPATIENT)
Dept: FAMILY MEDICINE | Facility: CLINIC | Age: 53
End: 2023-12-12
Payer: COMMERCIAL

## 2023-12-12 VITALS
SYSTOLIC BLOOD PRESSURE: 134 MMHG | HEART RATE: 88 BPM | OXYGEN SATURATION: 97 % | RESPIRATION RATE: 9 BRPM | DIASTOLIC BLOOD PRESSURE: 92 MMHG

## 2023-12-12 DIAGNOSIS — Z01.30 BP CHECK: Primary | ICD-10-CM

## 2023-12-12 PROCEDURE — 99207 PR NO CHARGE NURSE ONLY: CPT

## 2023-12-12 NOTE — PROGRESS NOTES
Thanh Huang is a 53 year old year old patient who comes in today for a Blood Pressure check because of elevated BP at 11/16/23 office visit and per provider request.   Vital Signs as repeated by RN     Vitals:    12/12/23 1024   BP: (!) 130/90   BP Location: Left arm   Patient Position: Sitting   Cuff Size: Adult Large   Pulse: 86   Resp: 11   SpO2: 98%     BP (!) 134/92 (BP Location: Left arm, Patient Position: Sitting, Cuff Size: Adult Regular)   Pulse 88   Resp (!) 9   SpO2 97%      After 5 minutes, the patient's blood pressure remained greater than or equal to 140/90.    Is the patient currently having any chest pain? No  Does the patient currently have a headache? No  Does the patient currently have any vision changes? No  Does the patient currently have any nausea? No  Does the patient currently have any abdominal pain? No      Patient is taking medication as prescribed  Patient is tolerating medications well.  Patient is not monitoring Blood Pressure at home.  Current complaints: none (see below)  Disposition:  huddled with provider     Patient reports recent cough that has lasted approximately 14 days at time of visit. Reports fatigue, congestion in chest accompanied by cough that was productive (greenish/yellow) and is now non productive as of a couple days ago. Denies chest pain and difficulty breathing. Patient reports coughing less frequently (improvement in cough) and fatigue at today's nurse visit; however states he woke up in the middle of the night due to eating onions that he reports usually causes GI upset and believes this is why. Patient advised no available appointments at [facility].  Patient stated he feels it just needs to run its course.     Message sent to provider who gave Ok to discharge patient from clinic and advise if symptoms don't improve to be seen for appointment. Relayed message to patient in clinic and patient verbalized understanding. Patient discharged.

## 2023-12-12 NOTE — PROGRESS NOTES
Blood pressure is still a little high, but better.  Continue with salt restriction and regular exercise and alcohol avoidance.  I would like to see him back in 3 months in person and we will recheck the blood pressure and see how he is doing.  If his cough does not continue to improve he should let us know and be seen.  Thank you.

## 2023-12-13 ENCOUNTER — TELEPHONE (OUTPATIENT)
Dept: INTERNAL MEDICINE | Facility: CLINIC | Age: 53
End: 2023-12-13
Payer: COMMERCIAL

## 2023-12-13 NOTE — TELEPHONE ENCOUNTER
See BP check nurse visit 12/12 for details on visit:    Janusz Romeo MD at 12/12/2023 10:15 AM    Status: Signed   Blood pressure is still a little high, but better.  Continue with salt restriction and regular exercise and alcohol avoidance.  I would like to see him back in 3 months in person and we will recheck the blood pressure and see how he is doing.  If his cough does not continue to improve he should let us know and be seen.  Thank you.            Attempted to call pt, no answer (1/3). Left message requesting pt to call back clinic.

## 2023-12-13 NOTE — TELEPHONE ENCOUNTER
Spoke with patient and relayed message from provider:    Janusz Romeo MD at 12/12/2023 10:15 AM     Status: Signed   Blood pressure is still a little high, but better.  Continue with salt restriction and regular exercise and alcohol avoidance.  I would like to see him back in 3 months in person and we will recheck the blood pressure and see how he is doing.  If his cough does not continue to improve he should let us know and be seen.  Thank you.          Discussed to contact clinic for any high BP readings or symptoms that arise when taking BP at home. Patient verbalized understanding.     Patient scheduled for follow up visit March 19th with PCP per patient availability.

## 2023-12-15 DIAGNOSIS — F32.1 MODERATE MAJOR DEPRESSION (H): ICD-10-CM

## 2023-12-18 RX ORDER — BUPROPION HYDROCHLORIDE 300 MG/1
300 TABLET ORAL EVERY MORNING
Qty: 90 TABLET | Refills: 1 | Status: SHIPPED | OUTPATIENT
Start: 2023-12-18 | End: 2024-06-19

## 2023-12-19 ENCOUNTER — MYC REFILL (OUTPATIENT)
Dept: INTERNAL MEDICINE | Facility: CLINIC | Age: 53
End: 2023-12-19
Payer: COMMERCIAL

## 2023-12-19 DIAGNOSIS — F32.1 MODERATE MAJOR DEPRESSION (H): ICD-10-CM

## 2023-12-19 RX ORDER — BUPROPION HYDROCHLORIDE 300 MG/1
300 TABLET ORAL EVERY MORNING
Qty: 90 TABLET | Refills: 1 | OUTPATIENT
Start: 2023-12-19

## 2024-03-11 ENCOUNTER — MYC REFILL (OUTPATIENT)
Dept: INTERNAL MEDICINE | Facility: CLINIC | Age: 54
End: 2024-03-11
Payer: COMMERCIAL

## 2024-03-11 DIAGNOSIS — L30.9 ECZEMA, UNSPECIFIED TYPE: ICD-10-CM

## 2024-03-11 RX ORDER — FLUOCINONIDE 0.5 MG/G
CREAM TOPICAL 2 TIMES DAILY
Qty: 15 G | Refills: 1 | Status: SHIPPED | OUTPATIENT
Start: 2024-03-11 | End: 2024-04-01

## 2024-04-01 ENCOUNTER — OFFICE VISIT (OUTPATIENT)
Dept: INTERNAL MEDICINE | Facility: CLINIC | Age: 54
End: 2024-04-01
Payer: COMMERCIAL

## 2024-04-01 VITALS
OXYGEN SATURATION: 97 % | DIASTOLIC BLOOD PRESSURE: 100 MMHG | WEIGHT: 207 LBS | RESPIRATION RATE: 18 BRPM | SYSTOLIC BLOOD PRESSURE: 154 MMHG | HEIGHT: 72 IN | HEART RATE: 80 BPM | TEMPERATURE: 97.8 F | BODY MASS INDEX: 28.04 KG/M2

## 2024-04-01 DIAGNOSIS — I10 ESSENTIAL HYPERTENSION: Primary | ICD-10-CM

## 2024-04-01 DIAGNOSIS — F32.1 MODERATE MAJOR DEPRESSION (H): ICD-10-CM

## 2024-04-01 DIAGNOSIS — L30.9 ECZEMA, UNSPECIFIED TYPE: ICD-10-CM

## 2024-04-01 DIAGNOSIS — E55.9 VITAMIN D DEFICIENCY: ICD-10-CM

## 2024-04-01 PROCEDURE — 90480 ADMN SARSCOV2 VAC 1/ONLY CMP: CPT | Performed by: INTERNAL MEDICINE

## 2024-04-01 PROCEDURE — 90715 TDAP VACCINE 7 YRS/> IM: CPT | Performed by: INTERNAL MEDICINE

## 2024-04-01 PROCEDURE — 90471 IMMUNIZATION ADMIN: CPT | Performed by: INTERNAL MEDICINE

## 2024-04-01 PROCEDURE — 90472 IMMUNIZATION ADMIN EACH ADD: CPT | Performed by: INTERNAL MEDICINE

## 2024-04-01 PROCEDURE — 91320 SARSCV2 VAC 30MCG TRS-SUC IM: CPT | Performed by: INTERNAL MEDICINE

## 2024-04-01 PROCEDURE — 99214 OFFICE O/P EST MOD 30 MIN: CPT | Mod: 25 | Performed by: INTERNAL MEDICINE

## 2024-04-01 PROCEDURE — 90677 PCV20 VACCINE IM: CPT | Performed by: INTERNAL MEDICINE

## 2024-04-01 RX ORDER — AMLODIPINE BESYLATE 5 MG/1
5 TABLET ORAL DAILY
Qty: 30 TABLET | Refills: 1 | Status: SHIPPED | OUTPATIENT
Start: 2024-04-01 | End: 2024-04-26

## 2024-04-01 RX ORDER — FLUOCINONIDE 0.5 MG/G
CREAM TOPICAL 2 TIMES DAILY
Qty: 15 G | Refills: 0 | Status: SHIPPED | OUTPATIENT
Start: 2024-04-01 | End: 2024-04-26

## 2024-04-01 ASSESSMENT — PATIENT HEALTH QUESTIONNAIRE - PHQ9
SUM OF ALL RESPONSES TO PHQ QUESTIONS 1-9: 4
SUM OF ALL RESPONSES TO PHQ QUESTIONS 1-9: 4
10. IF YOU CHECKED OFF ANY PROBLEMS, HOW DIFFICULT HAVE THESE PROBLEMS MADE IT FOR YOU TO DO YOUR WORK, TAKE CARE OF THINGS AT HOME, OR GET ALONG WITH OTHER PEOPLE: NOT DIFFICULT AT ALL

## 2024-04-01 NOTE — PROGRESS NOTES
"  1. Essential hypertension  Blood pressure much too high.  Add amlodipine 5 mg daily.  See me back in 3 weeks.  Also again on lifestyle management.  Salt restriction.  More regular exercise.  Alcohol avoidance.  Side effects discussed with patient.  Return in 3 weeks for follow-up  - amLODIPine (NORVASC) 5 MG tablet; Take 1 tablet (5 mg) by mouth daily  Dispense: 30 tablet; Refill: 1    2. Moderate major depression (H)  Controlled he reports.  Continue regimen    3. Vitamin D deficiency  He is taking his vitamin D.  Labs next visit    Darleen Law is a 53 year old, presenting for the following health issues:  Hypertension (BP follow up  )      4/1/2024     3:28 PM   Additional Questions   Roomed by CARINA Ontiveros   Accompanied by alone     History of Present Illness       Hypertension: He presents for follow up of hypertension.  He does not check blood pressure  regularly outside of the clinic. Outpatient blood pressures have not been over 140/90. He does not follow a low salt diet.     He eats 0-1 servings of fruits and vegetables daily.He consumes 2 sweetened beverage(s) daily.He exercises with enough effort to increase his heart rate 30 to 60 minutes per day.  He exercises with enough effort to increase his heart rate 3 or less days per week.   He is taking medications regularly.           Comes in today for follow-up of his high blood pressure.  He has not been checking his blood pressure.  He has not been very good about his diet or exercise as he planned on.  He is trying to be better about alcohol intake.  Mood has been okay he tells me.  He has been doing a lot of travel.          Objective    BP (!) 154/100 (BP Location: Left arm, Patient Position: Sitting, Cuff Size: Adult Large)   Pulse 80   Temp 97.8  F (36.6  C) (Tympanic)   Resp 18   Ht 1.816 m (5' 11.5\")   Wt 93.9 kg (207 lb)   SpO2 97%   BMI 28.47 kg/m    Body mass index is 28.47 kg/m .  Physical Exam               Signed " Electronically by: UMAIR CHEATHAM MD

## 2024-04-23 ENCOUNTER — OFFICE VISIT (OUTPATIENT)
Dept: INTERNAL MEDICINE | Facility: CLINIC | Age: 54
End: 2024-04-23
Payer: COMMERCIAL

## 2024-04-23 VITALS
HEIGHT: 72 IN | WEIGHT: 207 LBS | BODY MASS INDEX: 28.04 KG/M2 | DIASTOLIC BLOOD PRESSURE: 98 MMHG | OXYGEN SATURATION: 98 % | TEMPERATURE: 98.1 F | RESPIRATION RATE: 17 BRPM | HEART RATE: 85 BPM | SYSTOLIC BLOOD PRESSURE: 144 MMHG

## 2024-04-23 DIAGNOSIS — I10 ESSENTIAL HYPERTENSION: Primary | ICD-10-CM

## 2024-04-23 DIAGNOSIS — N20.0 CALCULUS OF KIDNEY: ICD-10-CM

## 2024-04-23 LAB
ALBUMIN UR-MCNC: NEGATIVE MG/DL
APPEARANCE UR: CLEAR
BILIRUB UR QL STRIP: NEGATIVE
COLOR UR AUTO: YELLOW
GLUCOSE UR STRIP-MCNC: NEGATIVE MG/DL
HGB UR QL STRIP: NEGATIVE
KETONES UR STRIP-MCNC: NEGATIVE MG/DL
LEUKOCYTE ESTERASE UR QL STRIP: NEGATIVE
NITRATE UR QL: NEGATIVE
PH UR STRIP: 5.5 [PH] (ref 5–8)
SP GR UR STRIP: 1.02 (ref 1–1.03)
UROBILINOGEN UR STRIP-ACNC: 0.2 E.U./DL

## 2024-04-23 PROCEDURE — 36415 COLL VENOUS BLD VENIPUNCTURE: CPT | Performed by: INTERNAL MEDICINE

## 2024-04-23 PROCEDURE — 80048 BASIC METABOLIC PNL TOTAL CA: CPT | Performed by: INTERNAL MEDICINE

## 2024-04-23 PROCEDURE — 99213 OFFICE O/P EST LOW 20 MIN: CPT | Performed by: INTERNAL MEDICINE

## 2024-04-23 PROCEDURE — 81003 URINALYSIS AUTO W/O SCOPE: CPT | Performed by: INTERNAL MEDICINE

## 2024-04-23 RX ORDER — LISINOPRIL 10 MG/1
10 TABLET ORAL DAILY
Qty: 30 TABLET | Refills: 1 | Status: SHIPPED | OUTPATIENT
Start: 2024-04-23 | End: 2024-06-19

## 2024-04-23 NOTE — PROGRESS NOTES
1. Essential hypertension  Uncontrolled.  I again discussed lifestyle modification at length.  Salt restriction and more regular exercise and alcohol avoidance urged.  Add lisinopril to the amlodipine.  We discussed side effects that can occur.  He will return in 1 to 2 weeks for blood work to ensure he is tolerating as well as a blood pressure check.  - Basic metabolic panel  (Ca, Cl, CO2, Creat, Gluc, K, Na, BUN); Future  - UA Macroscopic with reflex to Microscopic and Culture - Lab Collect; Future  - lisinopril (ZESTRIL) 10 MG tablet; Take 1 tablet (10 mg) by mouth daily  Dispense: 30 tablet; Refill: 1    2. Nephrolithiasis  No issues.  He tells me he is working on diet for prevention.    Subjective   Thanh is a 53 year old, presenting for the following health issues:  Hypertension (BP recheck; no side effects to med )      4/23/2024    10:28 AM   Additional Questions   Roomed by CARINA Ontiveros   Accompanied by alone     History of Present Illness       Hypertension: He presents for follow up of hypertension.  He does not check blood pressure  regularly outside of the clinic. Outpatient blood pressures have not been over 140/90. He follows a low salt diet.     He eats 0-1 servings of fruits and vegetables daily.He consumes 2 sweetened beverage(s) daily.He exercises with enough effort to increase his heart rate 10 to 19 minutes per day.  He exercises with enough effort to increase his heart rate 4 days per week.   He is taking medications regularly.           Thanh joins me for follow-up of his blood pressure.  He is tolerating the amlodipine without difficulty.  He has not been checking his blood pressure.  He has not been exercising.  He tells me he thinks he is eats a low sodium diet but on further discussion it sounds like he eats a lot of prepared foods and eats out quite a bit.  He is trying to cut back his alcohol.  Had 2 glasses of wine last evening.          Objective    BP (!) 144/98 (BP  "Location: Left arm, Patient Position: Sitting, Cuff Size: Adult Large)   Pulse 85   Temp 98.1  F (36.7  C) (Tympanic)   Resp 17   Ht 1.816 m (5' 11.5\")   Wt 93.9 kg (207 lb)   SpO2 98%   BMI 28.47 kg/m    Body mass index is 28.47 kg/m .  Physical Exam   Pleasant gentleman.  Blood pressure is high.  140s over close to 100 diastolic on my check            Signed Electronically by: UMAIR CHEATHAM MD    " Sriram Republic Sriram Republic/Sandra

## 2024-04-24 LAB
ANION GAP SERPL CALCULATED.3IONS-SCNC: 10 MMOL/L (ref 7–15)
BUN SERPL-MCNC: 16.9 MG/DL (ref 6–20)
CALCIUM SERPL-MCNC: 9.3 MG/DL (ref 8.6–10)
CHLORIDE SERPL-SCNC: 104 MMOL/L (ref 98–107)
CREAT SERPL-MCNC: 1.16 MG/DL (ref 0.67–1.17)
DEPRECATED HCO3 PLAS-SCNC: 26 MMOL/L (ref 22–29)
EGFRCR SERPLBLD CKD-EPI 2021: 76 ML/MIN/1.73M2
GLUCOSE SERPL-MCNC: 105 MG/DL (ref 70–99)
POTASSIUM SERPL-SCNC: 4.4 MMOL/L (ref 3.4–5.3)
SODIUM SERPL-SCNC: 140 MMOL/L (ref 135–145)

## 2024-04-26 ENCOUNTER — MYC REFILL (OUTPATIENT)
Dept: INTERNAL MEDICINE | Facility: CLINIC | Age: 54
End: 2024-04-26
Payer: COMMERCIAL

## 2024-04-26 DIAGNOSIS — L30.9 ECZEMA, UNSPECIFIED TYPE: ICD-10-CM

## 2024-04-26 DIAGNOSIS — I10 ESSENTIAL HYPERTENSION: ICD-10-CM

## 2024-04-29 RX ORDER — AMLODIPINE BESYLATE 5 MG/1
5 TABLET ORAL DAILY
Qty: 30 TABLET | Refills: 1 | Status: SHIPPED | OUTPATIENT
Start: 2024-04-29 | End: 2024-06-19

## 2024-04-29 RX ORDER — FLUOCINONIDE 0.5 MG/G
CREAM TOPICAL 2 TIMES DAILY
Qty: 15 G | Refills: 0 | Status: SHIPPED | OUTPATIENT
Start: 2024-04-29 | End: 2024-08-26

## 2024-05-01 DIAGNOSIS — I10 ESSENTIAL HYPERTENSION: Primary | ICD-10-CM

## 2024-05-03 ENCOUNTER — ALLIED HEALTH/NURSE VISIT (OUTPATIENT)
Dept: FAMILY MEDICINE | Facility: CLINIC | Age: 54
End: 2024-05-03
Payer: COMMERCIAL

## 2024-05-03 ENCOUNTER — LAB (OUTPATIENT)
Dept: LAB | Facility: CLINIC | Age: 54
End: 2024-05-03
Payer: COMMERCIAL

## 2024-05-03 VITALS — SYSTOLIC BLOOD PRESSURE: 134 MMHG | DIASTOLIC BLOOD PRESSURE: 88 MMHG

## 2024-05-03 DIAGNOSIS — Z01.30 BP CHECK: Primary | ICD-10-CM

## 2024-05-03 DIAGNOSIS — I10 ESSENTIAL HYPERTENSION: ICD-10-CM

## 2024-05-03 PROCEDURE — 36415 COLL VENOUS BLD VENIPUNCTURE: CPT

## 2024-05-03 PROCEDURE — 80048 BASIC METABOLIC PNL TOTAL CA: CPT

## 2024-05-03 PROCEDURE — 99207 PR NO CHARGE NURSE ONLY: CPT

## 2024-05-03 NOTE — PROGRESS NOTES
I met with Thanh Huang at the request of Dr. Romeo to recheck his blood pressure.  Blood pressure medications on the med list were reviewed with patient.    Patient has taken all medications as per usual regimen: Yes  Patient reports tolerating them without any issues or concerns: Yes    Vitals:    05/03/24 1330   BP: 134/88   BP Location: Left arm   Patient Position: Sitting   Cuff Size: Adult Regular       Blood pressure was taken, previous encounter was reviewed, recorded blood pressure below 140/90.  Patient was discharged and the note will be sent to the provider for final review.

## 2024-05-04 LAB
ANION GAP SERPL CALCULATED.3IONS-SCNC: 11 MMOL/L (ref 7–15)
BUN SERPL-MCNC: 15.7 MG/DL (ref 6–20)
CALCIUM SERPL-MCNC: 10.5 MG/DL (ref 8.6–10)
CHLORIDE SERPL-SCNC: 103 MMOL/L (ref 98–107)
CREAT SERPL-MCNC: 1.28 MG/DL (ref 0.67–1.17)
DEPRECATED HCO3 PLAS-SCNC: 25 MMOL/L (ref 22–29)
EGFRCR SERPLBLD CKD-EPI 2021: 67 ML/MIN/1.73M2
GLUCOSE SERPL-MCNC: 98 MG/DL (ref 70–99)
POTASSIUM SERPL-SCNC: 4.5 MMOL/L (ref 3.4–5.3)
SODIUM SERPL-SCNC: 139 MMOL/L (ref 135–145)

## 2024-05-06 NOTE — PROGRESS NOTES
Have him continue the same med regimen for now.  I would like to see him sometime this summer for a recheck.  Within 3 months.  Thanks.

## 2024-05-06 NOTE — PROGRESS NOTES
Spoke with patient to relay instructions from Dr. Romeo. Patient verbalizes understanding. No questions or concerns.

## 2024-06-19 ENCOUNTER — MYC MEDICAL ADVICE (OUTPATIENT)
Dept: INTERNAL MEDICINE | Facility: CLINIC | Age: 54
End: 2024-06-19
Payer: COMMERCIAL

## 2024-06-19 DIAGNOSIS — I10 ESSENTIAL HYPERTENSION: ICD-10-CM

## 2024-06-19 DIAGNOSIS — F32.1 MODERATE MAJOR DEPRESSION (H): ICD-10-CM

## 2024-06-19 RX ORDER — AMLODIPINE BESYLATE 5 MG/1
5 TABLET ORAL DAILY
Qty: 90 TABLET | Refills: 1 | Status: SHIPPED | OUTPATIENT
Start: 2024-06-19 | End: 2024-08-26

## 2024-06-19 RX ORDER — LISINOPRIL 10 MG/1
10 TABLET ORAL DAILY
Qty: 90 TABLET | Refills: 1 | Status: SHIPPED | OUTPATIENT
Start: 2024-06-19 | End: 2024-08-26

## 2024-06-19 RX ORDER — BUPROPION HYDROCHLORIDE 300 MG/1
300 TABLET ORAL EVERY MORNING
Qty: 90 TABLET | Refills: 1 | Status: SHIPPED | OUTPATIENT
Start: 2024-06-19

## 2024-08-02 ENCOUNTER — HOSPITAL ENCOUNTER (EMERGENCY)
Facility: CLINIC | Age: 54
Discharge: HOME | End: 2024-08-02
Attending: EMERGENCY MEDICINE | Admitting: EMERGENCY MEDICINE
Payer: COMMERCIAL

## 2024-08-02 ENCOUNTER — APPOINTMENT (OUTPATIENT)
Dept: CT IMAGING | Facility: CLINIC | Age: 54
End: 2024-08-02
Attending: EMERGENCY MEDICINE
Payer: COMMERCIAL

## 2024-08-02 VITALS
SYSTOLIC BLOOD PRESSURE: 143 MMHG | RESPIRATION RATE: 18 BRPM | HEIGHT: 72 IN | BODY MASS INDEX: 27.27 KG/M2 | OXYGEN SATURATION: 98 % | WEIGHT: 201.3 LBS | DIASTOLIC BLOOD PRESSURE: 94 MMHG | TEMPERATURE: 97 F | HEART RATE: 70 BPM

## 2024-08-02 DIAGNOSIS — N20.1 URETEROLITHIASIS: ICD-10-CM

## 2024-08-02 DIAGNOSIS — N20.0 CALCULUS OF KIDNEY: ICD-10-CM

## 2024-08-02 LAB
ALBUMIN UR-MCNC: 30 MG/DL
ANION GAP SERPL CALCULATED.3IONS-SCNC: 9 MMOL/L (ref 7–15)
APPEARANCE UR: ABNORMAL
BASOPHILS # BLD AUTO: 0 10E3/UL (ref 0–0.2)
BASOPHILS NFR BLD AUTO: 1 %
BILIRUB UR QL STRIP: NEGATIVE
BUN SERPL-MCNC: 16 MG/DL (ref 6–20)
CALCIUM SERPL-MCNC: 9.6 MG/DL (ref 8.8–10.4)
CHLORIDE SERPL-SCNC: 104 MMOL/L (ref 98–107)
COLOR UR AUTO: YELLOW
CREAT SERPL-MCNC: 1.21 MG/DL (ref 0.67–1.17)
EGFRCR SERPLBLD CKD-EPI 2021: 72 ML/MIN/1.73M2
EOSINOPHIL # BLD AUTO: 0.2 10E3/UL (ref 0–0.7)
EOSINOPHIL NFR BLD AUTO: 3 %
ERYTHROCYTE [DISTWIDTH] IN BLOOD BY AUTOMATED COUNT: 12.6 % (ref 10–15)
GLUCOSE SERPL-MCNC: 111 MG/DL (ref 70–99)
GLUCOSE UR STRIP-MCNC: NEGATIVE MG/DL
HCO3 SERPL-SCNC: 25 MMOL/L (ref 22–29)
HCT VFR BLD AUTO: 42.5 % (ref 40–53)
HGB BLD-MCNC: 14.5 G/DL (ref 13.3–17.7)
HGB UR QL STRIP: ABNORMAL
IMM GRANULOCYTES # BLD: 0.1 10E3/UL
IMM GRANULOCYTES NFR BLD: 1 %
KETONES UR STRIP-MCNC: NEGATIVE MG/DL
LEUKOCYTE ESTERASE UR QL STRIP: ABNORMAL
LYMPHOCYTES # BLD AUTO: 2.2 10E3/UL (ref 0.8–5.3)
LYMPHOCYTES NFR BLD AUTO: 37 %
MCH RBC QN AUTO: 31.2 PG (ref 26.5–33)
MCHC RBC AUTO-ENTMCNC: 34.1 G/DL (ref 31.5–36.5)
MCV RBC AUTO: 91 FL (ref 78–100)
MONOCYTES # BLD AUTO: 0.6 10E3/UL (ref 0–1.3)
MONOCYTES NFR BLD AUTO: 10 %
MUCOUS THREADS #/AREA URNS LPF: PRESENT /LPF
NEUTROPHILS # BLD AUTO: 3 10E3/UL (ref 1.6–8.3)
NEUTROPHILS NFR BLD AUTO: 49 %
NITRATE UR QL: NEGATIVE
NRBC # BLD AUTO: 0 10E3/UL
NRBC BLD AUTO-RTO: 0 /100
PH UR STRIP: 6.5 [PH] (ref 5–7)
PLATELET # BLD AUTO: 199 10E3/UL (ref 150–450)
POTASSIUM SERPL-SCNC: 5 MMOL/L (ref 3.4–5.3)
RBC # BLD AUTO: 4.65 10E6/UL (ref 4.4–5.9)
RBC URINE: >182 /HPF
SODIUM SERPL-SCNC: 138 MMOL/L (ref 135–145)
SP GR UR STRIP: 1.03 (ref 1–1.03)
UROBILINOGEN UR STRIP-MCNC: 2 MG/DL
WBC # BLD AUTO: 6.1 10E3/UL (ref 4–11)
WBC URINE: 2 /HPF

## 2024-08-02 PROCEDURE — 81003 URINALYSIS AUTO W/O SCOPE: CPT | Performed by: EMERGENCY MEDICINE

## 2024-08-02 PROCEDURE — 74176 CT ABD & PELVIS W/O CONTRAST: CPT

## 2024-08-02 PROCEDURE — 36415 COLL VENOUS BLD VENIPUNCTURE: CPT | Performed by: EMERGENCY MEDICINE

## 2024-08-02 PROCEDURE — 80048 BASIC METABOLIC PNL TOTAL CA: CPT | Performed by: EMERGENCY MEDICINE

## 2024-08-02 PROCEDURE — 99284 EMERGENCY DEPT VISIT MOD MDM: CPT | Mod: 25 | Performed by: EMERGENCY MEDICINE

## 2024-08-02 PROCEDURE — 85004 AUTOMATED DIFF WBC COUNT: CPT | Performed by: EMERGENCY MEDICINE

## 2024-08-02 RX ORDER — ONDANSETRON 4 MG/1
4 TABLET, FILM COATED ORAL EVERY 8 HOURS PRN
Qty: 10 TABLET | Refills: 0 | Status: SHIPPED | OUTPATIENT
Start: 2024-08-02 | End: 2024-08-26

## 2024-08-02 RX ORDER — OXYCODONE AND ACETAMINOPHEN 5; 325 MG/1; MG/1
1 TABLET ORAL EVERY 6 HOURS PRN
Qty: 12 TABLET | Refills: 0 | Status: SHIPPED | OUTPATIENT
Start: 2024-08-02 | End: 2024-08-05

## 2024-08-02 ASSESSMENT — COLUMBIA-SUICIDE SEVERITY RATING SCALE - C-SSRS
1. IN THE PAST MONTH, HAVE YOU WISHED YOU WERE DEAD OR WISHED YOU COULD GO TO SLEEP AND NOT WAKE UP?: NO
6. HAVE YOU EVER DONE ANYTHING, STARTED TO DO ANYTHING, OR PREPARED TO DO ANYTHING TO END YOUR LIFE?: NO
2. HAVE YOU ACTUALLY HAD ANY THOUGHTS OF KILLING YOURSELF IN THE PAST MONTH?: NO

## 2024-08-02 ASSESSMENT — ENCOUNTER SYMPTOMS
FLANK PAIN: 1
HEMATURIA: 1

## 2024-08-02 NOTE — ED PROVIDER NOTES
EMERGENCY DEPARTMENT ENCOUNTER      NAME: Thanh Huang  AGE: 53 year old male  YOB: 1970  MRN: [id number]  EVALUATION DATE & TIME: No admission date for patient encounter.    PCP: Janusz Romeo    ED PROVIDER: Douglas Brewster M.D.      Chief Complaint   Patient presents with    Flank Pain    Hematuria         FINAL IMPRESSION:  1.  Acute left flank pain.  2.  Acute left ureterolithiasis.    ED COURSE & MEDICAL DECISION MAKIN:17 AM  I met with the patient to gather history and to perform my initial exam. We discussed plans for the ED course, including diagnostic testing and treatment. PPE worn: cloth mask.  Onset this morning of suprapubic pain and left flank pain.  Feels like past kidney stones.  No associated symptoms.  8:24 AM.  CBC unremarkable.  Urine with multiple red cells but without infection.  Creatinine borderline at 1.21 other than chemistries negative.  Abdomen CT shows multiple tiny stones in both kidneys.  There is an obstructing 3 mm stone in the left proximal ureter with mild hydronephrosis and edema.  Results communicated to the patient.  Patient discharged home with urine strainer.  Patient in agreement.    Pertinent Labs & Imaging studies reviewed. (See chart for details)  53 year old male presents to the Emergency Department for evaluation of left flank pain.    At the conclusion of the encounter I discussed the results of all of the tests and the disposition. The questions were answered. The patient or family acknowledged understanding and was agreeable with the care plan.              Medical Decision Making  Obtained supplemental history:Supplemental history obtained?: Documented in chart  Reviewed external records: External records reviewed?: Documented in chart and Other:  Inpatient and external record reviewed.   Care impacted by chronic illness:Hypertension, Mental Health, and Other: nephrolithiasis   Care significantly affected by social determinants of  health:N/A  Did you consider but not order tests?: Work up considered but not performed and documented in chart, if applicable  Did you interpret images independently?: Independent interpretation of ECG and images noted in documentation, when applicable.  Consultation discussion with other provider:Did you involve another provider (consultant, , pharmacy, etc.)?: No  Anticipated discharge home after evaluation.      MEDICATIONS GIVEN IN THE EMERGENCY:  Medications - No data to display    NEW PRESCRIPTIONS STARTED AT TODAY'S ER VISIT  New Prescriptions    No medications on file          =================================================================    HPI    Patient information was obtained from: Patient     Use of : N/A       Thanh Huang is a 53 year old male with a pertinent history of hypertension, depressive disorder, and nephrolithiasis who presents to this ED via car for evaluation of left flank pain.     The patient reports left side flank pain along with hematuria that started around 6 am this morning. He also endorses pressure in the abdomen and bladder. Patient has history of kidney stones and states this feels like his previous kidney stones. He rates the pain a 2/10 right now. Patient also mentioned producing dark urine. No other complaints at this time.     He does not identify any waxing or waning symptoms otherwise, exacerbating or alleviating features, associated symptoms except as mentioned. He denies any pain related complaints.    REVIEW OF SYSTEMS   Review of Systems   Genitourinary:  Positive for flank pain and hematuria.   All other systems reviewed and are negative.       PAST MEDICAL HISTORY:  Past Medical History:   Diagnosis Date    Anorectal abscess     I&D- Dr. Vasques @ [facility] (7/20/2017)       Anxiety 2015    panic attacks/infrequent    Back pain skin tags    Blood pressure elevated 2015    borderline diastolics 88-92...    Depressive disorder     Dermatitis      Hypertension     Left nephrolithiasis     Perirectal abscess        PAST SURGICAL HISTORY:  Past Surgical History:   Procedure Laterality Date    INCISION AND DRAINAGE PERIRECTAL ABSCESS N/A 7/20/2017    Procedure: INCISION AND DRAINAGE RECTAL ABSCESS;  Surgeon: Pavel Vasques MD;  Location: [facility] [place];  Service:            CURRENT MEDICATIONS:    amLODIPine (NORVASC) 5 MG tablet  buPROPion (WELLBUTRIN XL) 300 MG 24 hr tablet  lisinopril (ZESTRIL) 10 MG tablet  fluocinonide (LIDEX) 0.05 % external cream  sildenafil (VIAGRA) 100 MG tablet        ALLERGIES:  Allergies   Allergen Reactions    Cats     Garlic Unknown    Melon Unknown    Onion Unknown       FAMILY HISTORY:  Family History   Adopted: Yes       SOCIAL HISTORY:   Social History     Socioeconomic History    Marital status: Single     Spouse name: None    Number of children: None    Years of education: None    Highest education level: None   Tobacco Use    Smoking status: Light Smoker     Types: Cigars    Smokeless tobacco: Never   Vaping Use    Vaping status: Never Used   Substance and Sexual Activity    Alcohol use: Yes     Alcohol/week: 14.0 standard drinks of alcohol    Drug use: No   Social History Narrative    RC  on leave since 2014. Grew up in [place]-adopted. Closest family is brother in [place].  Contact here Fr. Julio Hargrove. Rare tobacco/non drinker. ; lives in [place] with [facility]     Social Determinants of Health     Financial Resource Strain: Low Risk  (11/16/2023)    Financial Resource Strain     Within the past 12 months, have you or your family members you live with been unable to get utilities (heat, electricity) when it was really needed?: No   Food Insecurity: Low Risk  (11/16/2023)    Food Insecurity     Within the past 12 months, did you worry that your food would run out before you got money to buy more?: No     Within the past 12 months, did the food you bought just not last and you didn t have money to get  more?: No   Transportation Needs: Low Risk  (11/16/2023)    Transportation Needs     Within the past 12 months, has lack of transportation kept you from medical appointments, getting your medicines, non-medical meetings or appointments, work, or from getting things that you need?: No   Physical Activity: Sufficiently Active (2/10/2023)    Received from [facility]    Exercise Vital Sign     Days of Exercise per Week: 6 days     Minutes of Exercise per Session: 60 min   Stress: Stress Concern Present (2/10/2023)    Received from [facility]    [place] [employer] - Occupational Stress Questionnaire     Feeling of Stress : To some extent   Social Connections: Moderately Integrated (2/10/2023)    Received from [facility]    Social Connection and Isolation Panel [NHANES]     Frequency of Communication with Friends and Family: Three times a week     Frequency of Social Gatherings with Friends and Family: More than three times a week     Attends Alevism Services: More than 4 times per year     Active Member of Clubs or Organizations: Yes     Attends Club or Organization Meetings: More than 4 times per year     Marital Status: Never    Interpersonal Safety: Low Risk  (11/16/2023)    Interpersonal Safety     Do you feel physically and emotionally safe where you currently live?: Yes     Within the past 12 months, have you been hit, slapped, kicked or otherwise physically hurt by someone?: No     Within the past 12 months, have you been humiliated or emotionally abused in other ways by your partner or ex-partner?: No   Housing Stability: Low Risk  (11/16/2023)    Housing Stability     Do you have housing? : Yes     Are you worried about losing your housing?: No     Uses tobacco and alcohol.  No drugs.  VITALS:  BP (!) 142/86   Pulse 75   Temp 97  F (36.1  C) (Oral)   Resp 15   Ht 1.829 m (6')   Wt 91.3 kg (201 lb 4.8 oz)   SpO2 99%   BMI 27.30 kg/m       PHYSICAL EXAM    Vital Signs:  BP (!) 142/86   Pulse 75   Temp 97  F (36.1  C) (Oral)   Resp 15   Ht 1.829 m (6')   Wt 91.3 kg (201 lb 4.8 oz)   SpO2 99%   BMI 27.30 kg/m    General:  On entering the room he is in no apparent distress.    Neck:  Neck supple with full range of motion and nontender.    Back:  Back and spine are nontender.  No costovertebral angle tenderness on the right, but tender on the left costovertebral angle and left flank.  HEENT:  Oropharynx clear with moist mucous membranes.  HEENT unremarkable.    Pulmonary:  Chest clear to auscultation without rhonchi rales or wheezing.    Cardiovascular:  Cardiac regular rate and rhythm without murmurs rubs or gallops.    Abdomen:  Abdomen soft nontender.  There is no rebound or guarding.  Suprapubic discomfort.  Muskuloskeletal:  He moves all 4 without any difficulty and has normal neurovascular exams.  Extremities without clubbing, cyanosis, or edema.  Legs and calves are nontender.    Neuro:  He is alert and oriented ×3 and moves all extremities symmetrically.    Psych:  Normal affect.    Skin:  Unremarkable and warm and dry.       LAB:  All pertinent labs reviewed and interpreted.  Labs Ordered and Resulted from Time of ED Arrival to Time of ED Departure   ROUTINE UA WITH MICROSCOPIC REFLEX TO CULTURE - Abnormal       Result Value    Color Urine Yellow      Appearance Urine Turbid (*)     Glucose Urine Negative      Bilirubin Urine Negative      Ketones Urine Negative      Specific Gravity Urine 1.029      Blood Urine >1.0 mg/dL (*)     pH Urine 6.5      Protein Albumin Urine 30 (*)     Urobilinogen Urine 2.0 (*)     Nitrite Urine Negative      Leukocyte Esterase Urine 25 [place]/uL (*)     Mucus Urine Present (*)     RBC Urine >182 (*)     WBC Urine 2     BASIC METABOLIC PANEL - Abnormal    Sodium 138      Potassium 5.0      Chloride 104      Carbon Dioxide (CO2) 25      Anion Gap 9      Urea Nitrogen 16.0      Creatinine 1.21 (*)     GFR  Estimate 72      Calcium 9.6      Glucose 111 (*)    CBC WITH PLATELETS AND DIFFERENTIAL    WBC Count 6.1      RBC Count 4.65      Hemoglobin 14.5      Hematocrit 42.5      MCV 91      MCH 31.2      MCHC 34.1      RDW 12.6      Platelet Count 199      % Neutrophils 49      % Lymphocytes 37      % Monocytes 10      % Eosinophils 3      % Basophils 1      % Immature Granulocytes 1      NRBCs per 100 WBC 0      Absolute Neutrophils 3.0      Absolute Lymphocytes 2.2      Absolute Monocytes 0.6      Absolute Eosinophils 0.2      Absolute Basophils 0.0      Absolute Immature Granulocytes 0.1      Absolute NRBCs 0.0         RADIOLOGY:  Reviewed all pertinent imaging. Please see official radiology report.  Abd/pelvis CT no contrast - Stone Protocol   Final Result   IMPRESSION:    1.  Obstructing 3 mm left proximal ureteral stone with mild left hydronephrosis and perinephric edema.   2.  Small bilateral nonobstructing renal calculi.                    EKG:          PROCEDURES:         I, Maria De Jesus Pretty, am serving as a scribe to document services personally performed by Dr. Brewster based on my observation and the provider's statements to me. I, Douglas Brewster MD attest that Maria D eJesus Pretty is acting in a scribe capacity, has observed my performance of the services and has documented them in accordance with my direction.    Douglas Brewster M.D.  Emergency Medicine  [facility] EMERGENCY ROOM  [address] [address] [phone]  Dept: [phone]       Douglas Brewster MD  08/02/24 3932

## 2024-08-02 NOTE — DISCHARGE INSTRUCTIONS
Encourage fluids.  Strain all urine and save any stones for your doctor.  Follow-up with the kidney stone Panacea.  They should contact you for an appointment.  Ice or heat, Tylenol or ibuprofen can help with pain.  1 Percocet every 6 hours as needed for stronger pain.  Zofran every 6-8 hours if needed for nausea or vomiting.

## 2024-08-02 NOTE — ED TRIAGE NOTES
Pt comes to the ED with c/o L flank pain and hematuria. Has history of kidney stones. Flank pain started this morning at 0600.      Triage Assessment (Adult)       Row Name 08/02/24 0642          Triage Assessment    Airway WDL WDL        Respiratory WDL    Respiratory WDL WDL        Skin Circulation/Temperature WDL    Skin Circulation/Temperature WDL WDL        Cardiac WDL    Cardiac WDL WDL        Peripheral/Neurovascular WDL    Peripheral Neurovascular WDL WDL        Cognitive/Neuro/Behavioral WDL    Cognitive/Neuro/Behavioral WDL WDL

## 2024-08-05 ENCOUNTER — PATIENT OUTREACH (OUTPATIENT)
Dept: INTERNAL MEDICINE | Facility: CLINIC | Age: 54
End: 2024-08-05
Payer: COMMERCIAL

## 2024-08-05 NOTE — TELEPHONE ENCOUNTER
Transitions of Care Outreach  Chief Complaint   Patient presents with    Hospital F/U       Most Recent Admission Date: 8/2/2024   Most Recent Admission Diagnosis:      Most Recent Discharge Date: 8/2/2024   Most Recent Discharge Diagnosis: Ureterolithiasis - N20.1  Calculus of kidney - N20.0     Transitions of Care Assessment    Discharge Assessment  How are you doing now that you are home?: I'm doing pretty well but I haven't passed the stone yet.  How are your symptoms? (Red Flag symptoms escalate to triage hotline per guidelines): Improved  Do you know how to contact your clinic care team if you have future questions or changes to your health status? : Yes  Does the patient have their discharge instructions? : Yes  Does the patient have questions regarding their discharge instructions? : No  Were you started on any new medications or were there changes to any of your previous medications? : Yes  Does the patient have all of their medications?: Yes  Do you have questions regarding any of your medications? : No  Do you have all of your needed medical supplies or equipment (DME)?  (i.e. oxygen tank, CPAP, cane, etc.): Yes    Follow up Plan     Patient contacted [facility] to follow up with urology.     Discharge Follow-Up  Discharge follow up appointment scheduled in alignment with recommended follow up timeframe or Transitions of Risk Category? (Low = within 30 days; Moderate= within 14 days; High= within 7 days): Yes    Future Appointments   Date Time Provider Department Center   8/26/2024  9:00 AM Janusz Romeo MD [facility]       Outpatient Plan as outlined on AVS reviewed with patient.    For any urgent concerns, please contact our 24 hour nurse triage line: [phone] ([phone])       Terra Rodriguez RN

## 2024-08-25 ENCOUNTER — MYC MEDICAL ADVICE (OUTPATIENT)
Dept: INTERNAL MEDICINE | Facility: CLINIC | Age: 54
End: 2024-08-25
Payer: COMMERCIAL

## 2024-08-26 ENCOUNTER — OFFICE VISIT (OUTPATIENT)
Dept: INTERNAL MEDICINE | Facility: CLINIC | Age: 54
End: 2024-08-26
Payer: COMMERCIAL

## 2024-08-26 VITALS
HEIGHT: 72 IN | TEMPERATURE: 98.5 F | SYSTOLIC BLOOD PRESSURE: 128 MMHG | WEIGHT: 197.4 LBS | OXYGEN SATURATION: 98 % | RESPIRATION RATE: 14 BRPM | BODY MASS INDEX: 26.74 KG/M2 | DIASTOLIC BLOOD PRESSURE: 74 MMHG | HEART RATE: 84 BPM

## 2024-08-26 DIAGNOSIS — L30.9 ECZEMA, UNSPECIFIED TYPE: ICD-10-CM

## 2024-08-26 DIAGNOSIS — I10 ESSENTIAL HYPERTENSION: Primary | ICD-10-CM

## 2024-08-26 DIAGNOSIS — N20.0 CALCULUS OF KIDNEY: ICD-10-CM

## 2024-08-26 DIAGNOSIS — J06.9 UPPER RESPIRATORY TRACT INFECTION, UNSPECIFIED TYPE: ICD-10-CM

## 2024-08-26 DIAGNOSIS — Z20.822 EXPOSURE TO 2019 NOVEL CORONAVIRUS: ICD-10-CM

## 2024-08-26 LAB — SARS-COV-2 RNA RESP QL NAA+PROBE: NEGATIVE

## 2024-08-26 PROCEDURE — 99214 OFFICE O/P EST MOD 30 MIN: CPT | Performed by: INTERNAL MEDICINE

## 2024-08-26 PROCEDURE — 87635 SARS-COV-2 COVID-19 AMP PRB: CPT | Performed by: INTERNAL MEDICINE

## 2024-08-26 RX ORDER — LISINOPRIL 10 MG/1
10 TABLET ORAL DAILY
Qty: 90 TABLET | Refills: 3 | Status: SHIPPED | OUTPATIENT
Start: 2024-08-26

## 2024-08-26 RX ORDER — AMLODIPINE BESYLATE 5 MG/1
5 TABLET ORAL DAILY
Qty: 90 TABLET | Refills: 3 | Status: SHIPPED | OUTPATIENT
Start: 2024-08-26 | End: 2024-10-03

## 2024-08-26 RX ORDER — FLUOCINONIDE 0.5 MG/G
CREAM TOPICAL 2 TIMES DAILY
Qty: 15 G | Refills: 0 | Status: SHIPPED | OUTPATIENT
Start: 2024-08-26

## 2024-08-26 ASSESSMENT — ENCOUNTER SYMPTOMS: COUGH: 1

## 2024-08-26 NOTE — PROGRESS NOTES
1. Essential hypertension  Much better control with current regimen.  Continue same.  - lisinopril (ZESTRIL) 10 MG tablet; Take 1 tablet (10 mg) by mouth daily.  Dispense: 90 tablet; Refill: 3  - amLODIPine (NORVASC) 5 MG tablet; Take 1 tablet (5 mg) by mouth daily.  Dispense: 90 tablet; Refill: 3    2. Eczema, unspecified type    - fluocinonide (LIDEX) 0.05 % external cream; Apply topically 2 times daily. For no more than 7 days.  Dispense: 15 g; Refill: 0    3. Upper respiratory tract infection, unspecified type  Likely COVID.  He would probably qualify for Paxlovid but we discussed this today and he declines if he is positive.  - Symptomatic COVID-19 Virus (Coronavirus) by PCR    4. Exposure to 2019 novel coronavirus  I would recommend he cannot get on a flight with COVID this evening however.  - Symptomatic COVID-19 Virus (Coronavirus) by PCR    5. Nephrolithiasis  Recently diagnosed with recurrent nephrolithiasis.  Has a workup in progress through the [facility]   Thanh is a 54 year old, presenting for the following health issues:  RECHECK (Medication follow up) and Cough (Pt c/o productive cough, fatigue since last Friday. Pt states home Covid test was negative.)      8/26/2024     8:41 AM   Additional Questions   Roomed by Graham CISNEROS RN     Cough    History of Present Illness       Hypertension: He presents for follow up of hypertension.  He does not check blood pressure  regularly outside of the clinic. Outside blood pressures have been over 140/90. He does not follow a low salt diet.     He eats 0-1 servings of fruits and vegetables daily.He consumes 2 sweetened beverage(s) daily.He exercises with enough effort to increase his heart rate 20 to 29 minutes per day.  He exercises with enough effort to increase his heart rate 5 days per week.   He is taking medications regularly.           Patient comes in today for follow-up of his blood pressure.  He also has an upper  respiratory infection.  Significant other also had similar symptoms and had COVID.  He tested yesterday and was negative at home.  He supposed to get on a flight tonight.          Objective    /74 (BP Location: Left arm, Patient Position: Sitting, Cuff Size: Adult Regular)   Pulse 84   Temp 98.5  F (36.9  C) (Tympanic)   Resp 14   Ht 1.829 m (6')   Wt 89.5 kg (197 lb 6.4 oz)   SpO2 98%   BMI 26.77 kg/m    Body mass index is 26.77 kg/m .  Physical Exam   He sounds nasally congested.  Blood pressure looks excellent.  Lungs are clear            Signed Electronically by: UMAIR CHEATHAM MD

## 2024-09-26 NOTE — TELEPHONE ENCOUNTER
Please contact patient and remind him I wanted to see him this summer, sometime before end of August.  Please have him schedule an appointment.  Thank you.   neck negative

## 2024-10-03 ENCOUNTER — MYC REFILL (OUTPATIENT)
Dept: INTERNAL MEDICINE | Facility: CLINIC | Age: 54
End: 2024-10-03
Payer: COMMERCIAL

## 2024-10-03 DIAGNOSIS — I10 ESSENTIAL HYPERTENSION: ICD-10-CM

## 2024-10-03 RX ORDER — AMLODIPINE BESYLATE 5 MG/1
5 TABLET ORAL DAILY
Qty: 90 TABLET | Refills: 2 | Status: SHIPPED | OUTPATIENT
Start: 2024-10-03

## 2024-12-18 DIAGNOSIS — F32.1 MODERATE MAJOR DEPRESSION (H): ICD-10-CM

## 2024-12-18 RX ORDER — BUPROPION HYDROCHLORIDE 300 MG/1
300 TABLET ORAL EVERY MORNING
Qty: 90 TABLET | Refills: 1 | Status: SHIPPED | OUTPATIENT
Start: 2024-12-18

## 2025-01-18 ENCOUNTER — HEALTH MAINTENANCE LETTER (OUTPATIENT)
Age: 55
End: 2025-01-18

## 2025-02-01 NOTE — TELEPHONE ENCOUNTER
Called patient regarding up coming appt scheduled for 3/26/20 at 8:00 am.    Patient is willing to reschedule his Established care appt at this time. However patient does have some concerns regarding depression/anxiety. Patient never taken anything for depression/anxiety. I did complete PHQ-9 and OPAL-7 via phone (you can review information in flowsheet).    Patient reported that he was recommended to start on something for depression/anxiety in the past when he seen at [facility]. He doesn't recall what medication was suggested but he did not start this. (I have already called [facility] to retrieve records as well).     Please review if a Telephone Visit is appropriate to address these concerns via phone call or if he needs to come in, thank you.                    Patient was able to drink Gatorade without any difficulties. No signs of nausea

## 2025-06-09 ASSESSMENT — PATIENT HEALTH QUESTIONNAIRE - PHQ9
SUM OF ALL RESPONSES TO PHQ QUESTIONS 1-9: 2
10. IF YOU CHECKED OFF ANY PROBLEMS, HOW DIFFICULT HAVE THESE PROBLEMS MADE IT FOR YOU TO DO YOUR WORK, TAKE CARE OF THINGS AT HOME, OR GET ALONG WITH OTHER PEOPLE: NOT DIFFICULT AT ALL
SUM OF ALL RESPONSES TO PHQ QUESTIONS 1-9: 2

## 2025-06-10 ENCOUNTER — VIRTUAL VISIT (OUTPATIENT)
Dept: INTERNAL MEDICINE | Facility: CLINIC | Age: 55
End: 2025-06-10
Payer: COMMERCIAL

## 2025-06-10 DIAGNOSIS — F33.42 RECURRENT MAJOR DEPRESSIVE DISORDER, IN FULL REMISSION: ICD-10-CM

## 2025-06-10 DIAGNOSIS — F41.1 GENERALIZED ANXIETY DISORDER: Primary | ICD-10-CM

## 2025-06-10 PROCEDURE — 98005 SYNCH AUDIO-VIDEO EST LOW 20: CPT | Performed by: INTERNAL MEDICINE

## 2025-06-10 PROCEDURE — 1126F AMNT PAIN NOTED NONE PRSNT: CPT | Mod: 95 | Performed by: INTERNAL MEDICINE

## 2025-06-10 RX ORDER — BUPROPION HYDROCHLORIDE 150 MG/1
150 TABLET ORAL EVERY MORNING
Qty: 30 TABLET | Refills: 1 | Status: SHIPPED | OUTPATIENT
Start: 2025-06-10

## 2025-06-10 ASSESSMENT — ANXIETY QUESTIONNAIRES
3. WORRYING TOO MUCH ABOUT DIFFERENT THINGS: NOT AT ALL
5. BEING SO RESTLESS THAT IT IS HARD TO SIT STILL: NOT AT ALL
8. IF YOU CHECKED OFF ANY PROBLEMS, HOW DIFFICULT HAVE THESE MADE IT FOR YOU TO DO YOUR WORK, TAKE CARE OF THINGS AT HOME, OR GET ALONG WITH OTHER PEOPLE?: NOT DIFFICULT AT ALL
6. BECOMING EASILY ANNOYED OR IRRITABLE: NOT AT ALL
4. TROUBLE RELAXING: NOT AT ALL
IF YOU CHECKED OFF ANY PROBLEMS ON THIS QUESTIONNAIRE, HOW DIFFICULT HAVE THESE PROBLEMS MADE IT FOR YOU TO DO YOUR WORK, TAKE CARE OF THINGS AT HOME, OR GET ALONG WITH OTHER PEOPLE: NOT DIFFICULT AT ALL
GAD7 TOTAL SCORE: 0
2. NOT BEING ABLE TO STOP OR CONTROL WORRYING: NOT AT ALL
1. FEELING NERVOUS, ANXIOUS, OR ON EDGE: NOT AT ALL
7. FEELING AFRAID AS IF SOMETHING AWFUL MIGHT HAPPEN: NOT AT ALL
GAD7 TOTAL SCORE: 0

## 2025-06-10 NOTE — PROGRESS NOTES
Thanh is a 54 year old who is being evaluated via a billable video visit.          1. Generalized anxiety disorder (Primary)  I told him that people with depression can be weaned off antidepressants but people with anxiety tend to do better if they take them lifelong.  That being said if he wants to see how he does off of it we can try it.  I have told him to wean off the Wellbutrin to 150 mg a day for 3 weeks and then discontinue.  If he does not feel well he, his go back on it.  He understands  - buPROPion (WELLBUTRIN XL) 150 MG 24 hr tablet; Take 1 tablet (150 mg) by mouth every morning.  Dispense: 30 tablet; Refill: 1    2. Recurrent major depressive disorder, in full remission  As above  - buPROPion (WELLBUTRIN XL) 150 MG 24 hr tablet; Take 1 tablet (150 mg) by mouth every morning.  Dispense: 30 tablet; Refill: 1     Subjective   Thanh is a 54 year old, presenting for the following health issues:  Recheck Medication (Pt would like to review of bupropion, and whether still needed and if he can wean off of this medication. )        6/10/2025     2:19 PM   Additional Questions   Roomed by Trini     History of Present Illness       Mental Health Follow-up:  Patient presents to follow-up on Depression.Patient's depression since last visit has been:  Good  The patient is not having other symptoms associated with depression.      Any significant life events: No  Patient is not feeling anxious or having panic attacks.  Patient has no concerns about alcohol or drug use.    He eats 0-1 servings of fruits and vegetables daily.He consumes 1 sweetened beverage(s) daily.He exercises with enough effort to increase his heart rate 20 to 29 minutes per day.  He exercises with enough effort to increase his heart rate 4 days per week.   He is taking medications regularly.        Ava manage video visit.  He wants to maybe consider coming off of Wellbutrin.  He does not know if he needs it anymore.  He was started  on this about 5 years ago.  He had had a longstanding history of anxiety and depression.  He had been prescribed things in the past and did not tolerate or did not even take them.  He is done well with the Wellbutrin.  He would like to maybe consider coming off            Objective    Vitals - Patient Reported  Weight (Patient Reported): 91.2 kg (201 lb)  Pain Score: No Pain (0)        Physical Exam       He looks well and PHQ-9 and OPAL-7 noted.      Video-Visit Details    Type of service:  Video Visit   Originating Location (pt. Location): Home    Distant Location (provider location):  On-site  Platform used for Video Visit: Dustin  Signed Electronically by: UMAIR CHEATHAM MD